# Patient Record
Sex: FEMALE | Race: BLACK OR AFRICAN AMERICAN | Employment: FULL TIME | URBAN - METROPOLITAN AREA
[De-identification: names, ages, dates, MRNs, and addresses within clinical notes are randomized per-mention and may not be internally consistent; named-entity substitution may affect disease eponyms.]

---

## 2018-01-16 ENCOUNTER — ED HISTORICAL/CONVERTED ENCOUNTER (OUTPATIENT)
Dept: OTHER | Age: 33
End: 2018-01-16

## 2018-06-18 ENCOUNTER — HOSPITAL ENCOUNTER (OUTPATIENT)
Dept: PERINATAL CARE | Age: 33
Discharge: HOME | End: 2018-06-18

## 2019-09-23 ENCOUNTER — HOSPITAL ENCOUNTER (OUTPATIENT)
Dept: GENERAL RADIOLOGY | Age: 34
Discharge: HOME | End: 2019-09-23
Payer: COMMERCIAL

## 2019-09-23 DIAGNOSIS — M25.569 KNEE PAIN: ICD-10-CM

## 2019-09-23 PROCEDURE — 73565 X-RAY EXAM OF KNEES: CPT

## 2020-03-10 ENCOUNTER — HOSPITAL ENCOUNTER (EMERGENCY)
Age: 35
Discharge: HOME | End: 2020-03-10
Attending: STUDENT IN AN ORGANIZED HEALTH CARE EDUCATION/TRAINING PROGRAM | Admitting: STUDENT IN AN ORGANIZED HEALTH CARE EDUCATION/TRAINING PROGRAM
Payer: COMMERCIAL

## 2020-03-10 VITALS
DIASTOLIC BLOOD PRESSURE: 112 MMHG | SYSTOLIC BLOOD PRESSURE: 172 MMHG | HEIGHT: 59 IN | HEART RATE: 87 BPM | TEMPERATURE: 98.4 F | RESPIRATION RATE: 16 BRPM | OXYGEN SATURATION: 100 % | BODY MASS INDEX: 54.03 KG/M2 | WEIGHT: 268 LBS

## 2020-03-10 DIAGNOSIS — R03.0 ELEVATED BLOOD PRESSURE READING: ICD-10-CM

## 2020-03-10 DIAGNOSIS — M25.561 ACUTE PAIN OF RIGHT KNEE: Primary | ICD-10-CM

## 2020-03-10 PROCEDURE — 99282 EMERGENCY DEPT VISIT SF MDM: CPT

## 2020-03-10 RX ORDER — NAPROXEN 500 MG/1
500 TABLET ORAL 2 TIMES DAILY WITH MEALS
Qty: 14 TAB | Refills: 0 | Status: SHIPPED | OUTPATIENT
Start: 2020-03-10 | End: 2020-03-17

## 2020-03-10 NOTE — ED TRIAGE NOTES
Patient presents with intermittent knee pain (right > left) x 6 months. Diagnosed in 2018 with osteoarthritis. Denies recent injury prior to onset of this pain flare-up.

## 2020-03-10 NOTE — ED PROVIDER NOTES
37 y.o. female with past medical history significant for osteoarthritis and rheumatoid arthritis who presents from private vehicle with chief complaint of right knee pain. Pt c/o worsening intermittent bilateral knee pain (R>L) since 09/2019 but dealing with arthritis for years. Pt states she was diagnosed with osteoarthritis in 2018. Pt states she receives bilateral gel knee injections every 6 months. Pt mentions she was recommended to get an MRI for further evaluation. Pt reports coming to the ED to see if she is able to get an MRI. Pt notes she was recently diagnosed with rheumatoid arthritis; she was prescribed Gabapentin and Amitriptyline. Pt has not yet seen a Rheumatologist. Pt also reports a 65 lb weight loss. Pt denies recent injury, fever, rash, hx of gout. There are no other acute medical concerns at this time. Note written by Yesy Teran, as dictated by Haroldo Lopez PA-C 11:27 AM      The history is provided by the patient. No  was used. No past medical history on file. No past surgical history on file. No family history on file.     Social History     Socioeconomic History    Marital status:      Spouse name: Not on file    Number of children: Not on file    Years of education: Not on file    Highest education level: Not on file   Occupational History    Not on file   Social Needs    Financial resource strain: Not on file    Food insecurity:     Worry: Not on file     Inability: Not on file    Transportation needs:     Medical: Not on file     Non-medical: Not on file   Tobacco Use    Smoking status: Not on file   Substance and Sexual Activity    Alcohol use: Not on file    Drug use: Not on file    Sexual activity: Not on file   Lifestyle    Physical activity:     Days per week: Not on file     Minutes per session: Not on file    Stress: Not on file   Relationships    Social connections:     Talks on phone: Not on file     Gets together: Not on file     Attends Buddhism service: Not on file     Active member of club or organization: Not on file     Attends meetings of clubs or organizations: Not on file     Relationship status: Not on file    Intimate partner violence:     Fear of current or ex partner: Not on file     Emotionally abused: Not on file     Physically abused: Not on file     Forced sexual activity: Not on file   Other Topics Concern    Not on file   Social History Narrative    Not on file         ALLERGIES: Lisinopril    Review of Systems   Constitutional: Negative for chills and fever. HENT: Negative for sore throat. Respiratory: Negative for cough and shortness of breath. Cardiovascular: Negative for chest pain. Gastrointestinal: Negative for abdominal pain and vomiting. Genitourinary: Negative for dysuria. Musculoskeletal: Positive for arthralgias. Negative for back pain.        + Bilateral knee pain. Skin: Negative for rash. Neurological: Negative for syncope and headaches. Psychiatric/Behavioral: Negative for confusion. All other systems reviewed and are negative. Vitals:    03/10/20 1122   BP: (!) 185/97   Pulse: 87   Resp: 16   Temp: 98.4 °F (36.9 °C)   SpO2: 100%   Weight: 121.6 kg (268 lb)   Height: 4' 11\" (1.499 m)            Physical Exam  Vitals signs and nursing note reviewed. Constitutional:       General: She is not in acute distress. Appearance: She is well-developed. She is not toxic-appearing or diaphoretic. Comments: AA female, elevated BMI, ambulatory to ER   HENT:      Head: Normocephalic and atraumatic. Eyes:      Conjunctiva/sclera: Conjunctivae normal.   Neck:      Musculoskeletal: Full passive range of motion without pain and normal range of motion. Trachea: No tracheal tenderness. Cardiovascular:      Rate and Rhythm: Normal rate and regular rhythm. Pulses: Normal pulses.    Pulmonary:      Effort: Pulmonary effort is normal. No respiratory distress. Abdominal:      General: Bowel sounds are normal. There is no distension. Musculoskeletal:         General: Tenderness present. Comments: R knee - minimal warmth to medial aspect; no erythema, no palpable joint effusion. No calf or popliteal TTP. No LE edema or palpable cords. NVI throughout. No rash or signs of cellulitis. Skin:     General: Skin is warm and dry. Capillary Refill: Capillary refill takes less than 2 seconds. Findings: No abrasion, erythema or rash. Neurological:      Mental Status: She is alert and oriented to person, place, and time. Cranial Nerves: No cranial nerve deficit. Sensory: No sensory deficit. Coordination: Coordination normal.   Psychiatric:         Speech: Speech normal.         Behavior: Behavior normal.          MDM  Number of Diagnoses or Management Options  Acute pain of right knee:   Elevated blood pressure reading:   Diagnosis management comments:   Ddx: sprain, frx, meniscal tear / ligament injury       Amount and/or Complexity of Data Reviewed  Review and summarize past medical records: yes  Discuss the patient with other providers: yes    Patient Progress  Patient progress: stable         Procedures     42yo female, hx of arthritis and gets gel injections biannually with her orthopedist, knee pain worsening over time, has medial knee pain. She is ambulatory, no signs of DVT, no LE edema. Refused offer for ACE wrap, X-ray, lidoderm patch, diclofenac gel, Toradol and PO NSAIDs. Took her amlodipine before arrival, is asymptomatic, no dizziness, denies HA or weakness. Madhavi Hou PA-C      I discussed patient's PMH, exam findings as well as careplan with the ER attending who agrees with care plan. Madhavi Hou PA-C      DISCHARGE NOTE:  12:00 PM  The patient's results have been reviewed with them and/or available family.  Patient and/or family verbally conveyed their understanding and agreement of the patient's signs, symptoms, diagnosis, treatment and prognosis and additionally agree to follow up as recommended in the discharge instructions or to return to the Emergency Room should their condition change prior to their follow-up appointment. The patient/family verbally agrees with the care-plan and verbally conveys that all of their questions have been answered. The discharge instructions have also been provided to the patient and/or family with some educational information regarding the patient's diagnosis as well a list of reasons why the patient would want to return to the ER prior to their follow-up appointment, should their condition change. Plan:  1. F/U with pcp for BP check and ortho  2. Rx Naprosyn  3.  Refused offer for ACE wrap, X-ray, lidoderm patch, diclofenac gel, Toradol and PO NSAIDs  Return precautions discussed and advised to return to ER if worse

## 2020-03-10 NOTE — DISCHARGE INSTRUCTIONS

## 2020-03-10 NOTE — LETTER
[address] 
[facility] EMERGENCY DEPT 
[address] [place] [address] 
[phone] Work/School Note Date: 3/10/2020 To Whom It May concern: 
 
Aubrey Silvestre was seen and treated today in the emergency room by the following provider(s): 
Attending Provider: Tex Duggan DO Physician Assistant: Cass Archibald PA-C. uAbrey Silvestre may return to work on Wednesday, March 11, 2020.  
 
Sincerely, 
 
 
 
 
Karla Jim RN

## 2021-07-04 ENCOUNTER — APPOINTMENT (OUTPATIENT)
Dept: GENERAL RADIOLOGY | Age: 36
End: 2021-07-04
Attending: PHYSICIAN ASSISTANT
Payer: COMMERCIAL

## 2021-07-04 ENCOUNTER — HOSPITAL ENCOUNTER (EMERGENCY)
Age: 36
Discharge: HOME | End: 2021-07-04
Payer: COMMERCIAL

## 2021-07-04 VITALS
BODY MASS INDEX: 59.07 KG/M2 | SYSTOLIC BLOOD PRESSURE: 156 MMHG | RESPIRATION RATE: 16 BRPM | OXYGEN SATURATION: 96 % | HEART RATE: 88 BPM | TEMPERATURE: 98.2 F | DIASTOLIC BLOOD PRESSURE: 83 MMHG | WEIGHT: 293 LBS | HEIGHT: 59 IN

## 2021-07-04 DIAGNOSIS — J45.901 MODERATE ASTHMA WITH ACUTE EXACERBATION, UNSPECIFIED WHETHER PERSISTENT: ICD-10-CM

## 2021-07-04 DIAGNOSIS — J20.9 ACUTE BRONCHITIS, UNSPECIFIED ORGANISM: Primary | ICD-10-CM

## 2021-07-04 DIAGNOSIS — R05.3 PERSISTENT COUGH: ICD-10-CM

## 2021-07-04 LAB
FLUAV AG NPH QL IA: NEGATIVE
FLUBV AG NOSE QL IA: NEGATIVE

## 2021-07-04 PROCEDURE — 99282 EMERGENCY DEPT VISIT SF MDM: CPT

## 2021-07-04 PROCEDURE — 71046 X-RAY EXAM CHEST 2 VIEWS: CPT

## 2021-07-04 PROCEDURE — 87804 INFLUENZA ASSAY W/OPTIC: CPT

## 2021-07-04 RX ORDER — PREDNISONE 10 MG/1
TABLET ORAL
Qty: 18 TABLET | Refills: 0 | Status: SHIPPED | OUTPATIENT
Start: 2021-07-04 | End: 2021-07-07

## 2021-07-04 RX ORDER — AZITHROMYCIN 250 MG/1
250 TABLET, FILM COATED ORAL SEE ADMIN INSTRUCTIONS
Qty: 6 TABLET | Refills: 0 | Status: SHIPPED | OUTPATIENT
Start: 2021-07-04 | End: 2021-07-07

## 2021-07-04 RX ORDER — PROMETHAZINE HYDROCHLORIDE 6.25 MG/5ML
6.25 SYRUP ORAL
Qty: 100 ML | Refills: 0 | Status: SHIPPED | OUTPATIENT
Start: 2021-07-04 | End: 2021-07-07

## 2021-07-04 NOTE — ED PROVIDER NOTES
EMERGENCY DEPARTMENT HISTORY AND PHYSICAL EXAM      Date: 7/4/2021  Patient Name: Kaiser Jimenez    History of Presenting Illness     Chief Complaint   Patient presents with    Cough    Generalized Body Aches       History Provided By: Patient    HPI: Kaiser Jimenez, 38 y.o. female with a past medical history significant hypertension, obesity and RA, Asthma presents to the ED with cc of cough and congestion x1 week. Patient just finished a round of Levaquin for acute bronchitis. She was also told to stop taking all of her medications for RA while she was being treated for bronchitis. She had a recent chest x-ray without any evidence of pneumonia. Associated symptoms include chills and body aches. She does work at a [address] [place] [address] [employer] but denies any known sick contacts. She has been vaccinated against flu and COVID-19. No reported fevers. Patient has been using nebulizers and inhalers at home. She specifically denies chest pain, shortness of breath, abdominal pain, nausea, vomiting, diarrhea, recent travel, leg swelling. There are no other complaints, changes, or physical findings at this time. PCP: Thai Carmichael MD        Past History     Past Medical History:  Past Medical History:   Diagnosis Date    Asthma     Fibromyalgia     Rheumatoid arthritis ([facility] [address]        Past Surgical History:  No past surgical history on file. Family History:  No family history on file. Social History:  Social History     Tobacco Use    Smoking status: Not on file   Substance Use Topics    Alcohol use: Not on file    Drug use: Not on file       Allergies: Allergies   Allergen Reactions    Lisinopril Other (comments)     Her aunt had lip edema         Review of Systems   Review of Systems   Constitutional: Positive for chills. Negative for activity change and fever. HENT: Positive for congestion. Negative for ear pain, rhinorrhea, sneezing and sore throat. Eyes: Negative for pain and visual disturbance. Respiratory: Positive for cough and wheezing. Negative for shortness of breath. Cardiovascular: Negative for chest pain, palpitations and leg swelling. Gastrointestinal: Negative for abdominal pain, diarrhea, nausea and vomiting. Genitourinary: Negative for dysuria and hematuria. Musculoskeletal: Positive for myalgias. Negative for gait problem. Skin: Negative for rash. Neurological: Negative for speech difficulty, weakness and headaches. Psychiatric/Behavioral: The patient is not nervous/anxious. All other systems reviewed and are negative. Physical Exam   Physical Exam  Vitals and nursing note reviewed. Constitutional:       General: She is not in acute distress. Appearance: Normal appearance. She is not ill-appearing or toxic-appearing. HENT:      Head: Normocephalic and atraumatic. Right Ear: Tympanic membrane normal.      Left Ear: Tympanic membrane normal.      Nose: Nose normal. No congestion. Mouth/Throat:      Mouth: Mucous membranes are moist.      Pharynx: Oropharynx is clear. Posterior oropharyngeal erythema present. Eyes:      Extraocular Movements: Extraocular movements intact. Conjunctiva/sclera: Conjunctivae normal.      Pupils: Pupils are equal, round, and reactive to light. Cardiovascular:      Rate and Rhythm: Normal rate. Pulses: Normal pulses. Heart sounds: Normal heart sounds. Pulmonary:      Effort: Pulmonary effort is normal. No respiratory distress. Breath sounds: Decreased breath sounds present. No wheezing or rhonchi. Abdominal:      General: Bowel sounds are normal.      Palpations: Abdomen is soft. Tenderness: There is no abdominal tenderness. Musculoskeletal:         General: No deformity or signs of injury. Normal range of motion. Cervical back: Normal range of motion. Right lower leg: No edema. Left lower leg: No edema. Skin:     General: Skin is warm and dry.       Capillary Refill: Capillary refill takes less than 2 seconds. Findings: No rash. Neurological:      General: No focal deficit present. Mental Status: She is alert and oriented to person, place, and time. Cranial Nerves: No cranial nerve deficit. Psychiatric:         Mood and Affect: Mood normal.         Diagnostic Study Results     Labs -     Recent Results (from the past 48 hour(s))   COVID-19 RAPID TEST    Collection Time: 07/06/21 10:00 AM   Result Value Ref Range    Specimen source Nasopharyngeal      COVID-19 rapid test Not Detected Not Detected     CBC WITH AUTOMATED DIFF    Collection Time: 07/06/21 10:11 AM   Result Value Ref Range    WBC 14.5 (H) 3.6 - 11.0 K/uL    RBC 3.87 3.80 - 5.20 M/uL    HGB 12.1 11.5 - 16.0 g/dL    HCT 36.2 35.0 - 47.0 %    MCV 93.5 80.0 - 99.0 FL    MCH 31.3 26.0 - 34.0 PG    MCHC 33.4 30.0 - 36.5 g/dL    RDW 15.6 (H) 11.5 - 14.5 %    PLATELET 966 300 - 353 K/uL    MPV 10.0 8.9 - 12.9 FL    NRBC 0.0 0.0  WBC    ABSOLUTE NRBC 0.00 0.00 - 0.01 K/uL    NEUTROPHILS 61 32 - 75 %    LYMPHOCYTES 29 12 - 49 %    MONOCYTES 10 5 - 13 %    EOSINOPHILS 0 0 - 7 %    BASOPHILS 0 0 - 1 %    IMMATURE GRANULOCYTES 0 %    ABS. NEUTROPHILS 8.8 (H) 1.8 - 8.0 K/UL    ABS. LYMPHOCYTES 4.2 (H) 0.8 - 3.5 K/UL    ABS. MONOCYTES 1.5 (H) 0.0 - 1.0 K/UL    ABS. EOSINOPHILS 0.0 0.0 - 0.4 K/UL    ABS. BASOPHILS 0.0 0.0 - 0.1 K/UL    ABS. IMM.  GRANS. 0.0 K/UL    DF Smear Scanned      RBC COMMENTS Anisocytosis  1+       METABOLIC PANEL, COMPREHENSIVE    Collection Time: 07/06/21 10:11 AM   Result Value Ref Range    Sodium 137 136 - 145 mmol/L    Potassium 3.0 (L) 3.5 - 5.1 mmol/L    Chloride 105 97 - 108 mmol/L    CO2 24 21 - 32 mmol/L    Anion gap 8 5 - 15 mmol/L    Glucose 101 (H) 65 - 100 mg/dL    BUN 7 6 - 20 mg/dL    Creatinine 0.80 0.55 - 1.02 mg/dL    BUN/Creatinine ratio 9 (L) 12 - 20      GFR est AA >60 >60 ml/min/1.73m2    GFR est non-AA >60 >60 ml/min/1.73m2    Calcium 8.9 8.5 - 10.1 mg/dL Bilirubin, total 0.2 0.2 - 1.0 mg/dL    AST (SGOT) 36 15 - 37 U/L    ALT (SGPT) 29 12 - 78 U/L    Alk. phosphatase 90 45 - 117 U/L    Protein, total 7.5 6.4 - 8.2 g/dL    Albumin 3.1 (L) 3.5 - 5.0 g/dL    Globulin 4.4 (H) 2.0 - 4.0 g/dL    A-G Ratio 0.7 (L) 1.1 - 2.2     LACTIC ACID    Collection Time: 07/06/21 10:11 AM   Result Value Ref Range    Lactic acid 1.6 0.4 - 2.0 mmol/L   TROPONIN I    Collection Time: 07/06/21 10:11 AM   Result Value Ref Range    Troponin-I, Qt. <0.05 <0.05 ng/mL   BNP    Collection Time: 07/06/21 10:11 AM   Result Value Ref Range    NT pro- (H) <125 pg/mL   HCG QL SERUM    Collection Time: 07/06/21 10:11 AM   Result Value Ref Range    HCG, Ql. Negative Negative         Radiologic Studies -   XR Results (most recent):  Results from Hospital Encounter encounter on 07/06/21    XR CHEST SNGL V    Narrative  Chest, frontal view, 7/6/2021    History: Shortness of breath. Comparison: Including chest 7/4/2021. Findings: The study is limited by the patient's body habitus. The cardiac  silhouette is within normal limits. The lungs are adequately expanded. No  hydrostatic edema is present. No focal consolidation, pleural effusions or  pneumothorax is identified. No acute osseous findings are definitively seen. Impression  No acute pulmonary process. CT Results  (Last 48 hours)    None            Medical Decision Making and ED Course   I am the first provider for this patient. I reviewed the vital signs, available nursing notes, past medical history, past surgical history, family history and social history. Vital Signs-Reviewed the patient's vital signs.   Wt Readings from Last 3 Encounters:   07/06/21 133.8 kg (295 lb)   07/04/21 133.8 kg (295 lb)   03/10/20 121.6 kg (268 lb)     Temp Readings from Last 3 Encounters:   07/06/21 98.2 °F (36.8 °C)   07/04/21 98.2 °F (36.8 °C)   03/10/20 98.4 °F (36.9 °C)     BP Readings from Last 3 Encounters:   07/06/21 (!) 138/98 07/04/21 (!) 156/83   03/10/20 (!) 172/112     Pulse Readings from Last 3 Encounters:   07/06/21 82   07/04/21 88   03/10/20 87       No data found. Records Reviewed: Nursing Notes and Old Medical Records    Provider Notes (Medical Decision Making):       MDM  Number of Diagnoses or Management Options  Acute bronchitis, unspecified organism  Moderate asthma with acute exacerbation, unspecified whether persistent  Persistent cough  Diagnosis management comments: Differentials include asthma exacerbation, pneumonia, bronchitis, viral URI       Amount and/or Complexity of Data Reviewed  Clinical lab tests: ordered and reviewed  Tests in the radiology section of CPT®: ordered and reviewed  Review and summarize past medical records: yes          ED Course:   Initial assessment performed. The patients presenting problems have been discussed, and they are in agreement with the care plan formulated and outlined with them. I have encouraged them to ask questions as they arise throughout their visit. Procedures       Matt Ruano PA-C    Procedures     Matt Ruano PA-C        Disposition     Disposition: DC- Adult Discharges: All of the diagnostic tests were reviewed and questions answered. Diagnosis, care plan and treatment options were discussed. The patient understands the instructions and will follow up as directed. The patients results have been reviewed with them. They have been counseled regarding their diagnosis. The patient verbally convey understanding and agreement of the signs, symptoms, diagnosis, treatment and prognosis and additionally agrees to follow up as recommended with their PCP in 24 - 48 hours. They also agree with the care-plan and convey that all of their questions have been answered.   I have also put together some discharge instructions for them that include: 1) educational information regarding their diagnosis, 2) how to care for their diagnosis at home, as well a 3) list of reasons why they would want to return to the ED prior to their follow-up appointment, should their condition change. Discharged     DISCHARGE PLAN:  1. There are no discharge medications for this patient. 2.   Follow-up Information     Follow up With Specialties Details Why Contact Info    Comfort Smith MD [facility] Medicine Schedule an appointment as soon as possible for a visit  for follow up from ER visit [address] [facility] [address]  [phone]      [facility] EMERGENCY DEPT Emergency Medicine  As needed, If symptoms worsen [address] [facility] [address] [address] [phone]        3. Return to ED if worse   4. Discharge Medication List as of 7/4/2021 12:00 PM      START taking these medications    Details   azithromycin (Zithromax Z-Dio) 250 mg tablet Take 1 Tablet by mouth See Admin Instructions for 6 days. Follow instructions on package, Normal, Disp-6 Tablet, R-0      predniSONE (STERAPRED DS) 10 mg dose pack Take 3 tabs for 3 days, take 2 tabs for 3 days, and take 1 tab for 3 days, by mouth, once daily, Normal, Disp-18 Tablet, R-0      promethazine (PHENERGAN) 6.25 mg/5 mL syrup Take 5 mL by mouth four (4) times daily as needed for Nausea for up to 7 days. , Normal, Disp-100 mL, R-0             Diagnosis     Clinical Impression:   1. Acute bronchitis, unspecified organism    2. Moderate asthma with acute exacerbation, unspecified whether persistent    3. Persistent cough        Attestations:    Bryon Andino PA-C    Please note that this dictation was completed with [employer], the computer voice recognition software. Quite often unanticipated grammatical, syntax, homophones, and other interpretive errors are inadvertently transcribed by the computer software. Please disregard these errors. Please excuse any errors that have escaped final proofreading. Thank you.

## 2021-07-04 NOTE — ED TRIAGE NOTES
[facility] pt stated that she went to her doctor on Monday and was told that she did not have PNA but pt has been feeling bad; c/o wheezing/crackles when she breaths, body aches, fever of and on, and cough;  Hx RA

## 2021-07-06 ENCOUNTER — APPOINTMENT (OUTPATIENT)
Dept: CT IMAGING | Age: 36
End: 2021-07-06
Attending: INTERNAL MEDICINE
Payer: COMMERCIAL

## 2021-07-06 ENCOUNTER — HOSPITAL ENCOUNTER (OUTPATIENT)
Age: 36
Setting detail: OBSERVATION
Discharge: HOME | End: 2021-07-07
Attending: EMERGENCY MEDICINE | Admitting: HOSPITALIST
Payer: COMMERCIAL

## 2021-07-06 ENCOUNTER — APPOINTMENT (OUTPATIENT)
Dept: GENERAL RADIOLOGY | Age: 36
End: 2021-07-06
Attending: EMERGENCY MEDICINE
Payer: COMMERCIAL

## 2021-07-06 DIAGNOSIS — J18.9 COMMUNITY ACQUIRED PNEUMONIA, UNSPECIFIED LATERALITY: Primary | ICD-10-CM

## 2021-07-06 PROBLEM — J45.901 ACUTE BRONCHITIS WITH ASTHMA WITH ACUTE EXACERBATION: Status: ACTIVE | Noted: 2021-07-06

## 2021-07-06 PROBLEM — J20.9 ACUTE BRONCHITIS WITH ASTHMA WITH ACUTE EXACERBATION: Status: ACTIVE | Noted: 2021-07-06

## 2021-07-06 LAB
ALBUMIN SERPL-MCNC: 3.1 G/DL (ref 3.5–5)
ALBUMIN/GLOB SERPL: 0.7 {RATIO} (ref 1.1–2.2)
ALP SERPL-CCNC: 90 U/L (ref 45–117)
ALT SERPL-CCNC: 29 U/L (ref 12–78)
ANION GAP SERPL CALC-SCNC: 8 MMOL/L (ref 5–15)
AST SERPL W P-5'-P-CCNC: 36 U/L (ref 15–37)
BASOPHILS # BLD: 0 K/UL (ref 0–0.1)
BASOPHILS NFR BLD: 0 % (ref 0–1)
BILIRUB SERPL-MCNC: 0.2 MG/DL (ref 0.2–1)
BNP SERPL-MCNC: 277 PG/ML
BUN SERPL-MCNC: 7 MG/DL (ref 6–20)
BUN/CREAT SERPL: 9 (ref 12–20)
CA-I BLD-MCNC: 8.9 MG/DL (ref 8.5–10.1)
CHLORIDE SERPL-SCNC: 105 MMOL/L (ref 97–108)
CO2 SERPL-SCNC: 24 MMOL/L (ref 21–32)
COVID-19 RAPID TEST, COVR: NOT DETECTED
CREAT SERPL-MCNC: 0.8 MG/DL (ref 0.55–1.02)
DIFFERENTIAL METHOD BLD: ABNORMAL
EOSINOPHIL # BLD: 0 K/UL (ref 0–0.4)
EOSINOPHIL NFR BLD: 0 % (ref 0–7)
ERYTHROCYTE [DISTWIDTH] IN BLOOD BY AUTOMATED COUNT: 15.6 % (ref 11.5–14.5)
GLOBULIN SER CALC-MCNC: 4.4 G/DL (ref 2–4)
GLUCOSE SERPL-MCNC: 101 MG/DL (ref 65–100)
HCG SERPL QL: NEGATIVE
HCT VFR BLD AUTO: 36.2 % (ref 35–47)
HGB BLD-MCNC: 12.1 G/DL (ref 11.5–16)
IMM GRANULOCYTES # BLD AUTO: 0 K/UL
IMM GRANULOCYTES NFR BLD AUTO: 0 %
LACTATE SERPL-SCNC: 1.6 MMOL/L (ref 0.4–2)
LYMPHOCYTES # BLD: 4.2 K/UL (ref 0.8–3.5)
LYMPHOCYTES NFR BLD: 29 % (ref 12–49)
MCH RBC QN AUTO: 31.3 PG (ref 26–34)
MCHC RBC AUTO-ENTMCNC: 33.4 G/DL (ref 30–36.5)
MCV RBC AUTO: 93.5 FL (ref 80–99)
MONOCYTES # BLD: 1.5 K/UL (ref 0–1)
MONOCYTES NFR BLD: 10 % (ref 5–13)
NEUTS SEG # BLD: 8.8 K/UL (ref 1.8–8)
NEUTS SEG NFR BLD: 61 % (ref 32–75)
NRBC # BLD: 0 K/UL (ref 0–0.01)
NRBC BLD-RTO: 0 PER 100 WBC
PLATELET # BLD AUTO: 332 K/UL (ref 150–400)
PMV BLD AUTO: 10 FL (ref 8.9–12.9)
POTASSIUM SERPL-SCNC: 3 MMOL/L (ref 3.5–5.1)
PROT SERPL-MCNC: 7.5 G/DL (ref 6.4–8.2)
RBC # BLD AUTO: 3.87 M/UL (ref 3.8–5.2)
RBC MORPH BLD: ABNORMAL
SODIUM SERPL-SCNC: 137 MMOL/L (ref 136–145)
SPECIMEN SOURCE: NORMAL
TROPONIN I SERPL-MCNC: <0.05 NG/ML
WBC # BLD AUTO: 14.5 K/UL (ref 3.6–11)

## 2021-07-06 PROCEDURE — 74011250637 HC RX REV CODE- 250/637: Performed by: HOSPITALIST

## 2021-07-06 PROCEDURE — 96374 THER/PROPH/DIAG INJ IV PUSH: CPT

## 2021-07-06 PROCEDURE — 99218 HC RM OBSERVATION: CPT

## 2021-07-06 PROCEDURE — 84703 CHORIONIC GONADOTROPIN ASSAY: CPT

## 2021-07-06 PROCEDURE — 74011250637 HC RX REV CODE- 250/637: Performed by: EMERGENCY MEDICINE

## 2021-07-06 PROCEDURE — 74011636637 HC RX REV CODE- 636/637: Performed by: EMERGENCY MEDICINE

## 2021-07-06 PROCEDURE — 87635 SARS-COV-2 COVID-19 AMP PRB: CPT

## 2021-07-06 PROCEDURE — 96375 TX/PRO/DX INJ NEW DRUG ADDON: CPT

## 2021-07-06 PROCEDURE — 74011250636 HC RX REV CODE- 250/636: Performed by: NURSE PRACTITIONER

## 2021-07-06 PROCEDURE — 84484 ASSAY OF TROPONIN QUANT: CPT

## 2021-07-06 PROCEDURE — 96376 TX/PRO/DX INJ SAME DRUG ADON: CPT

## 2021-07-06 PROCEDURE — 93005 ELECTROCARDIOGRAM TRACING: CPT

## 2021-07-06 PROCEDURE — 74011000250 HC RX REV CODE- 250: Performed by: NURSE PRACTITIONER

## 2021-07-06 PROCEDURE — 94640 AIRWAY INHALATION TREATMENT: CPT

## 2021-07-06 PROCEDURE — 80053 COMPREHEN METABOLIC PANEL: CPT

## 2021-07-06 PROCEDURE — 99284 EMERGENCY DEPT VISIT MOD MDM: CPT

## 2021-07-06 PROCEDURE — 87040 BLOOD CULTURE FOR BACTERIA: CPT

## 2021-07-06 PROCEDURE — 83605 ASSAY OF LACTIC ACID: CPT

## 2021-07-06 PROCEDURE — 85025 COMPLETE CBC W/AUTO DIFF WBC: CPT

## 2021-07-06 PROCEDURE — 71250 CT THORAX DX C-: CPT

## 2021-07-06 PROCEDURE — 74011250637 HC RX REV CODE- 250/637: Performed by: NURSE PRACTITIONER

## 2021-07-06 PROCEDURE — 71045 X-RAY EXAM CHEST 1 VIEW: CPT

## 2021-07-06 PROCEDURE — 83880 ASSAY OF NATRIURETIC PEPTIDE: CPT

## 2021-07-06 RX ORDER — FOLIC ACID 1 MG/1
1 TABLET ORAL DAILY
Status: DISCONTINUED | OUTPATIENT
Start: 2021-07-07 | End: 2021-07-07 | Stop reason: HOSPADM

## 2021-07-06 RX ORDER — ALPRAZOLAM 0.25 MG/1
0.5 TABLET ORAL
Status: DISCONTINUED | OUTPATIENT
Start: 2021-07-06 | End: 2021-07-07 | Stop reason: HOSPADM

## 2021-07-06 RX ORDER — ALBUTEROL SULFATE 90 UG/1
2 AEROSOL, METERED RESPIRATORY (INHALATION)
Status: DISCONTINUED | OUTPATIENT
Start: 2021-07-06 | End: 2021-07-07 | Stop reason: HOSPADM

## 2021-07-06 RX ORDER — NICOTINE 7MG/24HR
1 PATCH, TRANSDERMAL 24 HOURS TRANSDERMAL DAILY
Status: DISCONTINUED | OUTPATIENT
Start: 2021-07-07 | End: 2021-07-07 | Stop reason: HOSPADM

## 2021-07-06 RX ORDER — PREDNISONE 20 MG/1
60 TABLET ORAL ONCE
Status: COMPLETED | OUTPATIENT
Start: 2021-07-06 | End: 2021-07-06

## 2021-07-06 RX ORDER — ADALIMUMAB 40MG/0.8ML
40 KIT SUBCUTANEOUS
COMMUNITY

## 2021-07-06 RX ORDER — IBUPROFEN 200 MG
200 TABLET ORAL
Status: DISCONTINUED | OUTPATIENT
Start: 2021-07-06 | End: 2021-07-06

## 2021-07-06 RX ORDER — ENOXAPARIN SODIUM 100 MG/ML
40 INJECTION SUBCUTANEOUS EVERY 24 HOURS
Status: DISCONTINUED | OUTPATIENT
Start: 2021-07-06 | End: 2021-07-07 | Stop reason: HOSPADM

## 2021-07-06 RX ORDER — BUDESONIDE AND FORMOTEROL FUMARATE DIHYDRATE 80; 4.5 UG/1; UG/1
2 AEROSOL RESPIRATORY (INHALATION)
Status: DISCONTINUED | OUTPATIENT
Start: 2021-07-06 | End: 2021-07-07 | Stop reason: HOSPADM

## 2021-07-06 RX ORDER — IBUPROFEN 200 MG
200 TABLET ORAL
Status: DISCONTINUED | OUTPATIENT
Start: 2021-07-06 | End: 2021-07-07 | Stop reason: HOSPADM

## 2021-07-06 RX ORDER — FUROSEMIDE 10 MG/ML
40 INJECTION INTRAMUSCULAR; INTRAVENOUS DAILY
Status: DISCONTINUED | OUTPATIENT
Start: 2021-07-07 | End: 2021-07-07 | Stop reason: HOSPADM

## 2021-07-06 RX ORDER — FUROSEMIDE 10 MG/ML
40 INJECTION INTRAMUSCULAR; INTRAVENOUS ONCE
Status: COMPLETED | OUTPATIENT
Start: 2021-07-06 | End: 2021-07-06

## 2021-07-06 RX ORDER — DOXYCYCLINE 50 MG/1
100 CAPSULE ORAL EVERY 12 HOURS
Status: DISCONTINUED | OUTPATIENT
Start: 2021-07-06 | End: 2021-07-06

## 2021-07-06 RX ORDER — LANOLIN ALCOHOL/MO/W.PET/CERES
1 CREAM (GRAM) TOPICAL
Status: DISCONTINUED | OUTPATIENT
Start: 2021-07-07 | End: 2021-07-06

## 2021-07-06 RX ORDER — AMLODIPINE BESYLATE 5 MG/1
10 TABLET ORAL DAILY
Status: DISCONTINUED | OUTPATIENT
Start: 2021-07-07 | End: 2021-07-07 | Stop reason: HOSPADM

## 2021-07-06 RX ORDER — POTASSIUM CHLORIDE 20 MEQ/1
40 TABLET, EXTENDED RELEASE ORAL
Status: COMPLETED | OUTPATIENT
Start: 2021-07-06 | End: 2021-07-06

## 2021-07-06 RX ORDER — ALBUTEROL SULFATE 90 UG/1
3 AEROSOL, METERED RESPIRATORY (INHALATION) ONCE
Status: COMPLETED | OUTPATIENT
Start: 2021-07-06 | End: 2021-07-06

## 2021-07-06 RX ORDER — ALPRAZOLAM 0.5 MG/1
0.5 TABLET ORAL
COMMUNITY
End: 2021-08-01

## 2021-07-06 RX ORDER — GABAPENTIN 300 MG/1
300 CAPSULE ORAL 3 TIMES DAILY
COMMUNITY

## 2021-07-06 RX ORDER — IPRATROPIUM BROMIDE AND ALBUTEROL SULFATE 2.5; .5 MG/3ML; MG/3ML
3 SOLUTION RESPIRATORY (INHALATION)
Status: DISCONTINUED | OUTPATIENT
Start: 2021-07-06 | End: 2021-07-07 | Stop reason: HOSPADM

## 2021-07-06 RX ORDER — PROMETHAZINE HYDROCHLORIDE AND CODEINE PHOSPHATE 6.25; 1 MG/5ML; MG/5ML
5 SOLUTION ORAL
Status: DISCONTINUED | OUTPATIENT
Start: 2021-07-06 | End: 2021-07-07

## 2021-07-06 RX ORDER — AMITRIPTYLINE HYDROCHLORIDE 10 MG/1
10 TABLET, FILM COATED ORAL
COMMUNITY

## 2021-07-06 RX ORDER — HYDROCHLOROTHIAZIDE 12.5 MG/1
12.5 TABLET ORAL DAILY
COMMUNITY

## 2021-07-06 RX ORDER — GABAPENTIN 300 MG/1
300 CAPSULE ORAL 3 TIMES DAILY
Status: DISCONTINUED | OUTPATIENT
Start: 2021-07-06 | End: 2021-07-07 | Stop reason: HOSPADM

## 2021-07-06 RX ORDER — BENZONATATE 100 MG/1
100 CAPSULE ORAL 3 TIMES DAILY
Status: DISCONTINUED | OUTPATIENT
Start: 2021-07-06 | End: 2021-07-07 | Stop reason: HOSPADM

## 2021-07-06 RX ORDER — LANOLIN ALCOHOL/MO/W.PET/CERES
CREAM (GRAM) TOPICAL
COMMUNITY

## 2021-07-06 RX ORDER — HYDROCHLOROTHIAZIDE 25 MG/1
12.5 TABLET ORAL 2 TIMES DAILY
Status: DISCONTINUED | OUTPATIENT
Start: 2021-07-07 | End: 2021-07-07 | Stop reason: HOSPADM

## 2021-07-06 RX ORDER — LANOLIN ALCOHOL/MO/W.PET/CERES
3 CREAM (GRAM) TOPICAL
Status: DISCONTINUED | OUTPATIENT
Start: 2021-07-06 | End: 2021-07-07 | Stop reason: HOSPADM

## 2021-07-06 RX ORDER — ONDANSETRON 2 MG/ML
4 INJECTION INTRAMUSCULAR; INTRAVENOUS
Status: DISCONTINUED | OUTPATIENT
Start: 2021-07-06 | End: 2021-07-07 | Stop reason: HOSPADM

## 2021-07-06 RX ORDER — DOXYCYCLINE 100 MG/1
100 CAPSULE ORAL EVERY 12 HOURS
Status: DISCONTINUED | OUTPATIENT
Start: 2021-07-06 | End: 2021-07-07 | Stop reason: HOSPADM

## 2021-07-06 RX ORDER — PREDNISONE 5 MG/1
5 TABLET ORAL DAILY
COMMUNITY

## 2021-07-06 RX ORDER — METHOTREXATE 2.5 MG/1
2.5 TABLET ORAL
COMMUNITY

## 2021-07-06 RX ORDER — FOLIC ACID 1 MG/1
1 TABLET ORAL DAILY
COMMUNITY

## 2021-07-06 RX ADMIN — POTASSIUM CHLORIDE 40 MEQ: 1500 TABLET, EXTENDED RELEASE ORAL at 11:38

## 2021-07-06 RX ADMIN — DOXYCYCLINE HYCLATE 100 MG: 100 CAPSULE ORAL at 16:29

## 2021-07-06 RX ADMIN — BENZONATATE 100 MG: 100 CAPSULE ORAL at 16:29

## 2021-07-06 RX ADMIN — FAMOTIDINE 20 MG: 10 INJECTION, SOLUTION INTRAVENOUS at 21:49

## 2021-07-06 RX ADMIN — GABAPENTIN 300 MG: 300 CAPSULE ORAL at 21:46

## 2021-07-06 RX ADMIN — ALBUTEROL SULFATE 3 PUFF: 108 AEROSOL, METERED RESPIRATORY (INHALATION) at 10:44

## 2021-07-06 RX ADMIN — PREDNISONE 60 MG: 20 TABLET ORAL at 10:21

## 2021-07-06 RX ADMIN — FAMOTIDINE 20 MG: 10 INJECTION, SOLUTION INTRAVENOUS at 16:28

## 2021-07-06 RX ADMIN — FUROSEMIDE 40 MG: 10 INJECTION, SOLUTION INTRAMUSCULAR; INTRAVENOUS at 13:59

## 2021-07-06 RX ADMIN — BUDESONIDE AND FORMOTEROL FUMARATE DIHYDRATE 2 PUFF: 80; 4.5 AEROSOL RESPIRATORY (INHALATION) at 19:29

## 2021-07-06 RX ADMIN — GABAPENTIN 300 MG: 300 CAPSULE ORAL at 16:29

## 2021-07-06 RX ADMIN — BENZONATATE 100 MG: 100 CAPSULE ORAL at 21:46

## 2021-07-06 NOTE — ED NOTES
Care assumed and bedside SBAR report endorsed on Javid Hughes. Pt cardiac monitoring continued , spO2 Monitoring continued, IV reassed, call bell within reach, side rails up x2, resting comfortable but easily arousable, no signs of acute distress. , bed in lowest position, MAR reviewed, Labs reviewed, will continue to monitor

## 2021-07-06 NOTE — PROGRESS NOTES
7/6/21. PCP is Renetta Wheat. D/C Plan is home & pt plans to drive self home - drove here- car in parking lot. Declined home health/no needs/uses no DME.

## 2021-07-06 NOTE — H&P
History and Physical    Patient: Gibran Jules MRN: [id number]  SSN: [id number]    YOB: 1985  Age: 38 y.o. Sex: female      Subjective:      Gibran Jules is a 38 y.o. female who comes to the ED with complaints of dyspnea, cough and leg edema. She reports of approximately one day episode, she denies contact with sick persons. PMH includes asthma, hypertension , rheumatoid arthritis, fibromyalgia, neuropathy, and anxiety. CXR does not show any acute process, previous x ray on  shows patchy bilateral perihilar opacities airspace disease and edema. Pt will be admitted for further evaluation and treatment. Past Medical History:   Diagnosis Date    Asthma     Fibromyalgia     Hypertension     Rheumatoid arthritis ([facility] [address]      History reviewed. No pertinent surgical history. Family History   Problem Relation Age of Onset   Royal Echols Hypertension Mother     Hypertension Father     Asthma Sister     Asthma Other     Asthma Nephew      Social History     Tobacco Use    Smoking status: Former Smoker     Packs/day: 0.50     Years: 10.00     Pack years: 5.00     Types: Cigarettes     Quit date: 2021     Years since quittin.0    Smokeless tobacco: Never Used   Substance Use Topics    Alcohol use: Not Currently      Prior to Admission medications    Medication Sig Start Date End Date Taking? Authorizing Provider   azithromycin (Zithromax Z-Dio) 250 mg tablet Take 1 Tablet by mouth See Admin Instructions for 6 days. Follow instructions on package 7/4/21 7/10/21 Yes GayMarnie Barrios PA-C   predniSONE (STERAPRED DS) 10 mg dose pack Take 3 tabs for 3 days, take 2 tabs for 3 days, and take 1 tab for 3 days, by mouth, once daily 21  Yes Marnie Flores PA-C   promethazine (PHENERGAN) 6.25 mg/5 mL syrup Take 5 mL by mouth four (4) times daily as needed for Nausea for up to 7 days.  21  Aviva Champion PA-C        Allergies   Allergen Reactions    Lisinopril Other (comments)     Her aunt had lip edema       Review of Systems:  Review of Systems   Constitutional: Negative for chills and fever. HENT: Positive for congestion. Negative for sore throat. Respiratory: Positive for cough, shortness of breath and wheezing. Cardiovascular: Negative for chest pain and palpitations. Gastrointestinal: Positive for constipation. Negative for abdominal pain, heartburn, nausea and vomiting. Genitourinary: Negative for dysuria and urgency. Musculoskeletal: Positive for myalgias. Neurological: Negative for dizziness and headaches. Objective:     Vitals:    07/06/21 1003 07/06/21 1012 07/06/21 1047   BP: (!) 147/103  (!) 138/98   Pulse: 97  82   Resp: 24  22   Temp: 98.2 °F (36.8 °C)     SpO2: 98% 98% 98%   Weight: 133.8 kg (295 lb)     Height: 4' 11\" (1.499 m)          Physical Exam:  Physical Exam  Constitutional:       General: She is not in acute distress. Appearance: She is obese. HENT:      Head: Normocephalic and atraumatic. Mouth/Throat:      Mouth: Mucous membranes are moist.   Cardiovascular:      Rate and Rhythm: Normal rate and regular rhythm. Pulses: Normal pulses. Heart sounds: Normal heart sounds. Pulmonary:      Breath sounds: Wheezing and rhonchi present. Musculoskeletal:         General: Normal range of motion. Skin:     General: Skin is warm. Neurological:      Mental Status: She is oriented to person, place, and time.    Psychiatric:         Mood and Affect: Mood normal.         Behavior: Behavior normal.          Recent Results (from the past 24 hour(s))   COVID-19 RAPID TEST    Collection Time: 07/06/21 10:00 AM   Result Value Ref Range    Specimen source Nasopharyngeal      COVID-19 rapid test Not Detected Not Detected     CBC WITH AUTOMATED DIFF    Collection Time: 07/06/21 10:11 AM   Result Value Ref Range    WBC 14.5 (H) 3.6 - 11.0 K/uL    RBC 3.87 3.80 - 5.20 M/uL    HGB 12.1 11.5 - 16.0 g/dL    HCT 36.2 35.0 - 47.0 % MCV 93.5 80.0 - 99.0 FL    MCH 31.3 26.0 - 34.0 PG    MCHC 33.4 30.0 - 36.5 g/dL    RDW 15.6 (H) 11.5 - 14.5 %    PLATELET 562 195 - 854 K/uL    MPV 10.0 8.9 - 12.9 FL    NRBC 0.0 0.0  WBC    ABSOLUTE NRBC 0.00 0.00 - 0.01 K/uL    NEUTROPHILS 61 32 - 75 %    LYMPHOCYTES 29 12 - 49 %    MONOCYTES 10 5 - 13 %    EOSINOPHILS 0 0 - 7 %    BASOPHILS 0 0 - 1 %    IMMATURE GRANULOCYTES 0 %    ABS. NEUTROPHILS 8.8 (H) 1.8 - 8.0 K/UL    ABS. LYMPHOCYTES 4.2 (H) 0.8 - 3.5 K/UL    ABS. MONOCYTES 1.5 (H) 0.0 - 1.0 K/UL    ABS. EOSINOPHILS 0.0 0.0 - 0.4 K/UL    ABS. BASOPHILS 0.0 0.0 - 0.1 K/UL    ABS. IMM. GRANS. 0.0 K/UL    DF Smear Scanned      RBC COMMENTS Anisocytosis  1+       METABOLIC PANEL, COMPREHENSIVE    Collection Time: 07/06/21 10:11 AM   Result Value Ref Range    Sodium 137 136 - 145 mmol/L    Potassium 3.0 (L) 3.5 - 5.1 mmol/L    Chloride 105 97 - 108 mmol/L    CO2 24 21 - 32 mmol/L    Anion gap 8 5 - 15 mmol/L    Glucose 101 (H) 65 - 100 mg/dL    BUN 7 6 - 20 mg/dL    Creatinine 0.80 0.55 - 1.02 mg/dL    BUN/Creatinine ratio 9 (L) 12 - 20      GFR est AA >60 >60 ml/min/1.73m2    GFR est non-AA >60 >60 ml/min/1.73m2    Calcium 8.9 8.5 - 10.1 mg/dL    Bilirubin, total 0.2 0.2 - 1.0 mg/dL    AST (SGOT) 36 15 - 37 U/L    ALT (SGPT) 29 12 - 78 U/L    Alk. phosphatase 90 45 - 117 U/L    Protein, total 7.5 6.4 - 8.2 g/dL    Albumin 3.1 (L) 3.5 - 5.0 g/dL    Globulin 4.4 (H) 2.0 - 4.0 g/dL    A-G Ratio 0.7 (L) 1.1 - 2.2     LACTIC ACID    Collection Time: 07/06/21 10:11 AM   Result Value Ref Range    Lactic acid 1.6 0.4 - 2.0 mmol/L   TROPONIN I    Collection Time: 07/06/21 10:11 AM   Result Value Ref Range    Troponin-I, Qt. <0.05 <0.05 ng/mL   BNP    Collection Time: 07/06/21 10:11 AM   Result Value Ref Range    NT pro- (H) <125 pg/mL   HCG QL SERUM    Collection Time: 07/06/21 10:11 AM   Result Value Ref Range    HCG, Ql. Negative Negative         XR Results (maximum last 3):   Results from [facility] encounter on 07/06/21    XR CHEST SNGL V    Narrative  Chest, frontal view, 7/6/2021    History: Shortness of breath. Comparison: Including chest 7/4/2021. Findings: The study is limited by the patient's body habitus. The cardiac  silhouette is within normal limits. The lungs are adequately expanded. No  hydrostatic edema is present. No focal consolidation, pleural effusions or  pneumothorax is identified. No acute osseous findings are definitively seen. Impression  No acute pulmonary process. Results from [place] encounter on 07/04/21    XR CHEST PA LAT    Narrative  Chest, 2 views. Comparison: 11/2/2016. Findings: The cardiomediastinal silhouette is within normal limits. There are  patchy bilateral perihilar opacities. No pleural effusion or pneumothorax is  identified. The osseous structures are unremarkable. Impression  Patchy bilateral perihilar opacities, likely a combination of edema and airspace  disease. Results from [place] encounter on 09/23/19    XR KNEES BI STAND    Narrative  EXAM:  XR KNEES BI STAND    INDICATION:   Knee pain. COMPARISON: None. FINDINGS: 3 views of the bilateral knees were obtained. There is no evidence of  acute fracture. In the right knee, there is tricompartmental degenerative  disease, moderate in the patellofemoral compartment. Small right knee joint  effusion. In the left knee, there is tricompartmental degenerative disease,  moderate in the patellofemoral compartment. A small left knee joint effusion is  also present. Impression  IMPRESSION:  1. Tricompartmental degenerative disease bilaterally. Small joint effusions  bilaterally. No acute fracture. CT Results (maximum last 3): No results found for this or any previous visit. MRI Results (maximum last 3): No results found for this or any previous visit. Nuclear Medicine Results (maximum last 3):   No results found for this or any previous visit. US Results (maximum last 3): No results found for this or any previous visit. Active Problems:    Pneumonia (7/6/2021)      Acute bronchitis with asthma with acute exacerbation (7/6/2021)        Assessment/Plan:   1. Acute asthma /bronchitis exacerbation- start IV steroids, inhaler bid duo nebs,  Cough medication, O2 as needed, give one dose of IV lasix today. Consult to pulmonary    2. Hypertension- restart amlodipine, HCTZ    3. Hx of rheumatoid arthritis- on methotrexate, gabapentin. 4. ACP planning completed.        DVT/GI Prophylaxis lovenox / pepcid  Code Status  POA mother Jessica Garza   Total Time 35 minutes      Signed By: Kristine Giles NP     July 6, 2021

## 2021-07-06 NOTE — Clinical Note
Status[de-identified] INPATIENT [101]   Type of Bed: Remote Telemetry [29]   Cardiac Monitoring Required?: Yes   Inpatient Hospitalization Certified Necessary for the Following Reasons: 3.  Patient receiving treatment that can only be provided in an inpatient setting (further clarification in H&P documentation)   Admitting Diagnosis: Pneumonia [707405]   Admitting Physician: Mattie Vaughan [[id number]]   Attending Physician: Mattie Vaughan [[id number]]   Estimated Length of Stay: 3-4 Midnights   Discharge Plan[de-identified] Home with Office Follow-up

## 2021-07-06 NOTE — PROGRESS NOTES
Reason for Admission:  PNA                     RUR Score:  8%                   Plan for utilizing home health:  Declined/no needs/uses no DME. PCP: First and Last name:  Lynda Owusu MD     Name of Practice:    Are you a current patient: Yes/No: Yes   Approximate date of last visit: 7/6/21/today. Can you participate in a virtual visit with your PCP: Yes/Call( has cell phone). Current Advanced Directive/Advance Care Plan: Full Code      Healthcare Decision Maker:                Primary Decision Maker: Tammylucas Wharton - Mother - [phone]                  Transition of Care Plan:                    D/C Plan is home & pt plans to drive self home - drove here - car in parking lot.

## 2021-07-06 NOTE — PROGRESS NOTES
Pt arrived to floor at [address] via wheelchair. Pt alert and oriented, ambulates independently. O2 sat on room air was 98, pt complaining of 4/10 pain in her chest. No other complaints at this time. Will pass on to oncoming RN.

## 2021-07-06 NOTE — PROGRESS NOTES
Advance Care Planning     Advance Care Planning (ACP) Physician/NP/PA Conversation      Date of Conversation: 7/6/2021  Conducted with: Patient with [address] Decision Maker:     Click here to complete [employer] including selection of the [employer] Relationship (ie \"Primary\")  Today we documented Decision Maker(s) consistent with Legal Next of Kin hierarchy. Care Preferences:    Hospitalization: \"If your health worsens and it becomes clear that your chance of recovery is unlikely, what would be your preference regarding hospitalization? \"  The patient would prefer hospitalization. Ventilation: \"If you were unable to breathe on your own and your chance of recovery was unlikely, what would be your preference about the use of a ventilator (breathing machine) if it was available to you? \"   The patient would desire the use of a ventilator. Resuscitation: \"In the event your heart stopped as a result of an underlying serious health condition, would you want attempts to be made to restart your heart, or would you prefer a natural death? \"   Yes, attempt to resuscitate.     Additional topics discussed: treatment goals    Conversation Outcomes / Follow-Up Plan:   ACP complete - no further action today  Reviewed DNR/DNI and patient elects Full Code (Attempt Resuscitation)     Length of Voluntary ACP Conversation in minutes:  <16 minutes (Non-Billable)    Kenzie Ghotra NP

## 2021-07-06 NOTE — CONSULTS
Pulmonary and Critical Care Consult    Subjective:   Consult Note: 2021 @no control      Chief Complaint:   Chief Complaint   Patient presents with    Shortness of Breath    Cough        This patient has been seen and evaluated at the request of Danny Gavin NP. Patient is a 38 y.o. female admitted with shortness of breath. Active smoker  1 pack a day off and on for 19 years  Family history of asthma  Diagnosed with asthmatic bronchitis 2 years back  Does not use any maintenance inhalers  Very well controlled    Does have a prior history of fibromyalgia, hypertension, and rheumatoid arthritis  Was apparently on methotrexate for rheumatoid arthritis with recent increase in dose  Patient tells me that the day after taking the higher dose she noticed increased shortness of breath and coughing  Yellowish phlegm production  Also has increased pedal edema recently    Chest x-ray reviewed personally and shows bilateral perihilar infiltrates      Review of Systems:  A comprehensive review of systems was negative except for that written in the HPI. Past Medical History:   Diagnosis Date    Asthma     Fibromyalgia     Hypertension     Rheumatoid arthritis ([address]      History reviewed. No pertinent surgical history.    Family History   Problem Relation Age of Onset   [employer] Hypertension Mother     Hypertension Father     Asthma Sister     Asthma Other     Asthma Nephew      Social History     Tobacco Use    Smoking status: Former Smoker     Packs/day: 0.50     Years: 10.00     Pack years: 5.00     Types: Cigarettes     Quit date: 2021     Years since quittin.0    Smokeless tobacco: Never Used   Substance Use Topics    Alcohol use: Not Currently      Current Facility-Administered Medications   Medication Dose Route Frequency Provider Last Rate Last Admin    ALPRAZolam (XANAX) tablet 0.5 mg  0.5 mg Oral BID PRN Aric Delarosa NP        [START ON 2021] amLODIPine (NORVASC) tablet 10 mg  10 mg Oral DAILY Aric Delarosa NP        [START ON 7/7/2021] hydroCHLOROthiazide (HYDRODIURIL) tablet 12.5 mg  12.5 mg Oral BID Aric Delarosa NP        [START ON 7/7/2021] methylPREDNISolone (PF) (SOLU-MEDROL) injection 40 mg  40 mg IntraVENous Q8H Aric Delarosa NP        [START ON 9/2/1537] folic acid (FOLVITE) tablet 1 mg  1 mg Oral DAILY Aric Delarosa NP        [START ON 7/7/2021] ferrous sulfate tablet 325 mg  1 Tablet Oral DAILY WITH BREAKFAST Aric Delarosa NP        promethazine-codeine (PHENERGAN with CODEINE) 6.25-10 mg/5 mL syrup 5 mL  5 mL Oral Q4H PRN Aric Delarosa NP        gabapentin (NEURONTIN) capsule 300 mg  300 mg Oral TID Aric Delarosa NP        ondansetron [facility]) injection 4 mg  4 mg IntraVENous Q6H PRN Aric Delarosa NP        ibuprofen (MOTRIN) tablet 200 mg  200 mg Oral Q6H PRN Aric Delarosa NP        melatonin tablet 3 mg  3 mg Oral QHS PRN Aric Delarosa NP        budesonide-formoterol (SYMBICORT) 80-4.5 mcg inhaler  2 Puff Inhalation BID RT Aric Delarosa NP        albuterol-ipratropium (DUO-NEB) 2.5 MG-0.5 MG/3 ML  3 mL Nebulization Q4H PRN Aric Delarosa NP        albuterol (PROVENTIL HFA, VENTOLIN HFA, PROAIR HFA) inhaler 2 Puff  2 Puff Inhalation Q4H PRN Aric Delarosa NP        benzonatate (TESSALON) capsule 100 mg  100 mg Oral TID Aric Delarosa NP        [START ON 7/7/2021] nicotine (NICODERM CQ) 7 mg/24 hr patch 1 Patch  1 Patch TransDERmal DAILY Aric Delarosa NP        enoxaparin (LOVENOX) injection 40 mg  40 mg SubCUTAneous Q24H Aric Delarosa NP        famotidine (PF) (PEPCID) 20 mg in 0.9% sodium chloride 10 mL injection  20 mg IntraVENous Q12H Aric Delarosa, NP         Current Outpatient Medications   Medication Sig Dispense Refill    azithromycin (Zithromax Z-Dio) 250 mg tablet Take 1 Tablet by mouth See Admin Instructions for 6 days.  Follow instructions on package 6 Tablet 0    predniSONE (STERAPRED DS) 10 mg dose pack Take 3 tabs for 3 days, take 2 tabs for 3 days, and take 1 tab for 3 days, by mouth, once daily 18 Tablet 0    promethazine (PHENERGAN) 6.25 mg/5 mL syrup Take 5 mL by mouth four (4) times daily as needed for Nausea for up to 7 days. 100 mL 0          Allergies   Allergen Reactions    Lisinopril Other (comments)     Her aunt had lip edema           Objective:     Blood pressure (!) 138/98, pulse 82, temperature 98.2 °F (36.8 °C), resp. rate 22, height 4' 11\" (1.499 m), weight 133.8 kg (295 lb), SpO2 98 %. Temp (24hrs), Av.2 °F (36.8 °C), Min:98.2 °F (36.8 °C), Max:98.2 °F (36.8 °C)      Intake and Output:  Current Shift: No intake/output data recorded. Last 3 Shifts: No intake/output data recorded. No intake or output data in the 24 hours ending 21 1512     Physical Exam:     General:  Sitting up in bed comfortably, no acute distress. Obese  Eye: Reactive, symmetric  Throat and Neck: Supple  Lung: Reduced air entry bilaterally with prolonged exhalation, occ wheezing. Occasional crackles. Heart: S1+S2. No murmurs  Abdomen: soft, non-tender. Bowel sounds normal. No masses; obese  Extremities:  1-2+ lower extremity edema  : Not done  Skin: No cyanosis  Neurologic: A & O x3.   Grossly nonfocal  Psychiatric: Appropriate affect; coherent      Lab/Data Review:    Recent Results (from the past 24 hour(s))   COVID-19 RAPID TEST    Collection Time: 21 10:00 AM   Result Value Ref Range    Specimen source Nasopharyngeal      COVID-19 rapid test Not Detected Not Detected     CBC WITH AUTOMATED DIFF    Collection Time: 21 10:11 AM   Result Value Ref Range    WBC 14.5 (H) 3.6 - 11.0 K/uL    RBC 3.87 3.80 - 5.20 M/uL    HGB 12.1 11.5 - 16.0 g/dL    HCT 36.2 35.0 - 47.0 %    MCV 93.5 80.0 - 99.0 FL    MCH 31.3 26.0 - 34.0 PG    MCHC 33.4 30.0 - 36.5 g/dL    RDW 15.6 (H) 11.5 - 14.5 %    PLATELET 328 228 - 637 K/uL    MPV 10.0 8.9 - 12.9 FL    NRBC 0.0 0.0  WBC    ABSOLUTE NRBC 0.00 0.00 - 0.01 K/uL    NEUTROPHILS 61 32 - 75 %    LYMPHOCYTES 29 12 - 49 %    MONOCYTES 10 5 - 13 %    EOSINOPHILS 0 0 - 7 %    BASOPHILS 0 0 - 1 %    IMMATURE GRANULOCYTES 0 %    ABS. NEUTROPHILS 8.8 (H) 1.8 - 8.0 K/UL    ABS. LYMPHOCYTES 4.2 (H) 0.8 - 3.5 K/UL    ABS. MONOCYTES 1.5 (H) 0.0 - 1.0 K/UL    ABS. EOSINOPHILS 0.0 0.0 - 0.4 K/UL    ABS. BASOPHILS 0.0 0.0 - 0.1 K/UL    ABS. IMM. GRANS. 0.0 K/UL    DF Smear Scanned      RBC COMMENTS Anisocytosis  1+       METABOLIC PANEL, COMPREHENSIVE    Collection Time: 07/06/21 10:11 AM   Result Value Ref Range    Sodium 137 136 - 145 mmol/L    Potassium 3.0 (L) 3.5 - 5.1 mmol/L    Chloride 105 97 - 108 mmol/L    CO2 24 21 - 32 mmol/L    Anion gap 8 5 - 15 mmol/L    Glucose 101 (H) 65 - 100 mg/dL    BUN 7 6 - 20 mg/dL    Creatinine 0.80 0.55 - 1.02 mg/dL    BUN/Creatinine ratio 9 (L) 12 - 20      GFR est AA >60 >60 ml/min/1.73m2    GFR est non-AA >60 >60 ml/min/1.73m2    Calcium 8.9 8.5 - 10.1 mg/dL    Bilirubin, total 0.2 0.2 - 1.0 mg/dL    AST (SGOT) 36 15 - 37 U/L    ALT (SGPT) 29 12 - 78 U/L    Alk. phosphatase 90 45 - 117 U/L    Protein, total 7.5 6.4 - 8.2 g/dL    Albumin 3.1 (L) 3.5 - 5.0 g/dL    Globulin 4.4 (H) 2.0 - 4.0 g/dL    A-G Ratio 0.7 (L) 1.1 - 2.2     LACTIC ACID    Collection Time: 07/06/21 10:11 AM   Result Value Ref Range    Lactic acid 1.6 0.4 - 2.0 mmol/L   TROPONIN I    Collection Time: 07/06/21 10:11 AM   Result Value Ref Range    Troponin-I, Qt. <0.05 <0.05 ng/mL   BNP    Collection Time: 07/06/21 10:11 AM   Result Value Ref Range    NT pro- (H) <125 pg/mL   HCG QL SERUM    Collection Time: 07/06/21 10:11 AM   Result Value Ref Range    HCG, Ql. Negative Negative         XR CHEST SNGL V   Final Result   No acute pulmonary process. CT Results  (Last 48 hours)    None            Assessment:     1. Bilateral pulmonary infiltrates  2. Acute exacerbation of asthma  3. Acute diastolic congestive heart failure  4. Shortness of breath  5. Cough  6. Active smoker  7. Obesity  8. Rheumatoid arthritis  9. Leukocytosis  10. Hypertension    Plan:     Patient admitted to the hospital  We will be watching her closely    Will use oxygen supplementation if needed to keep saturation above 92%  Currently on room air    Bilateral infiltrates  Mostly perihilar  Etiology unclear  BNP elevated  Have to consider CHF versus infectious process given patient's use of methotrexate and immunocompromised status    Continue Solu-Medrol 40 mg every 8 hours  Start doxycycline 100 mg p.o. twice daily  Cultures to be sent  Further changes in antibiotics based on clinical response and culture results  We will proceed with CT scan without contrast to clarify the nature of perihilar infiltrates    Agree with Lasix 40 mg IV daily  Intake and output charting  Check electrolytes and replace as needed    Smoking cessation counseling was performed  NicoDerm patch    Continue blood pressure medications    DVT and GI prophylaxis    Patient will benefit from PFTs and sleep study after discharge    Questions of patient were answered at bedside in detail  Case discussed in detail with RN, RT, and care team  Thank you for involving me in the care of this patient  I will follow with you closely during hospitalization    Time spent more than 45 minutes in direct patient care with no overlap reviewing results and records, decision making, and answering questions.       Jose Elias Lopez MD  [facility]  7/6/2021  3:12 PM

## 2021-07-06 NOTE — ED NOTES
TRANSFER - OUT REPORT:    Verbal report given to [facility] FOREIGN SANCHEZ (name) on Mikayla East  being transferred to (unit) for routine progression of care       Report consisted of patients Situation, Background, Assessment and   Recommendations(SBAR). Information from the following report(s) SBAR was reviewed with the receiving nurse. Lines:   Peripheral IV 07/06/21 Right Antecubital (Active)   Site Assessment Clean, dry, & intact 07/06/21 1010   Phlebitis Assessment 0 07/06/21 1010   Infiltration Assessment 0 07/06/21 1010   Dressing Status Clean, dry, & intact 07/06/21 1010   Dressing Type Transparent 07/06/21 1010   Hub Color/Line Status Pink;Flushed;Patent 07/06/21 1010        Opportunity for questions and clarification was provided.       Patient transported with:   Registered Nurse

## 2021-07-06 NOTE — ED PROVIDER NOTES
HPI   Chief Complaint   Patient presents with    Shortness of Breath    Cough     68-year-old female history of asthma, fibromyalgia, rheumatoid arthritis presents with shortness of breath and cough. Patient reports a little over 1 week of sick symptoms which consist of cough, fatigue, myalgias, diaphoresis, poor appetite. Patient has been taking azithromycin, Levaquin, home inhaler, home nebulizer, low-dose prednisone without any relief. Today feeling severely constantly short of breath with wheezing despite taking home nebulizer this morning. Denies any chest pain. Denies any fevers. Patient is on methotrexate for rheumatoid arthritis for the past month. Patient reports both she and her boyfriend have had COVID-19 vaccination. Past Medical History:   Diagnosis Date    Asthma     Fibromyalgia     Rheumatoid arthritis ([facility] [address]        No past surgical history on file. No family history on file. Social History     Socioeconomic History    Marital status:      Spouse name: Not on file    Number of children: Not on file    Years of education: Not on file    Highest education level: Not on file   Occupational History    Not on file   Tobacco Use    Smoking status: Not on file   Substance and Sexual Activity    Alcohol use: Not on file    Drug use: Not on file    Sexual activity: Not on file   Other Topics Concern    Not on file   Social History Narrative    Not on file     Social Determinants of Health     Financial Resource Strain:     Difficulty of Paying Living Expenses:    Food Insecurity:     Worried About Running Out of Food in the Last Year:     [address] in the Last Year:    Transportation Needs:     Lack of Transportation (Medical):      Lack of Transportation (Non-Medical):    Physical Activity:     Days of Exercise per Week:     Minutes of Exercise per Session:    Stress:     Feeling of Stress :    Social Connections:     Frequency of Communication with Friends and Family:     Frequency of Social Gatherings with Friends and Family:     Attends Zoroast[employer] Services:     Active Member of Clubs or Organizations:     Attends Club or Organization Meetings:     Marital Status:    Intimate Partner Violence:     Fear of Current or Ex-Partner:     Emotionally Abused:     Physically Abused:     Sexually Abused: ALLERGIES: Lisinopril    Review of Systems   Constitutional: Positive for appetite change, diaphoresis and fatigue. Respiratory: Positive for cough, shortness of breath and wheezing. Musculoskeletal: Positive for myalgias. All other systems reviewed and are negative. Vitals:    07/06/21 1003 07/06/21 1012 07/06/21 1047   BP: (!) 147/103  (!) 138/98   Pulse: 97  82   Resp: 24  22   Temp: 98.2 °F (36.8 °C)     SpO2: 98% 98% 98%   Weight: 133.8 kg (295 lb)     Height: 4' 11\" (1.499 m)              Physical Exam   Patient Vitals for the past 8 hrs:   Temp Pulse Resp BP SpO2   07/06/21 1047  82 22 (!) 138/98 98 %   07/06/21 1012     98 %   07/06/21 1003 98.2 °F (36.8 °C) 97 24 (!) 147/103 98 %        Nursing note and vitals reviewed. Constitutional: NAD. Head: Normocephalic and atraumatic. Mouth/Throat: Airway patent. Moist mucous membranes. Eyes: EOMI. No scleral icterus. Neck: Neck supple. Cardiovascular: Normal rate, regular rhythm. Normal heart sounds. No murmur, rub, or gallop. Good pulses throughout. Pulmonary/Chest: Mild resp distress on room air with tachypnea. Diffusely mildly diminished breath sounds with wheezing. Abdominal/GI: BS normal, Soft, non-tender, non-distended. No rebound or guarding. Musculoskeletal: No gross injuries or deformities. No ankle swelling bilaterally. Neurological: Alert and oriented to person, place, and time. Cranial Nerves 2-12 intact. Moving all extremities. No gross deficits. Psych: Pleasant, cooperative. Skin: Skin is warm and dry. No rash noted.     MDM   Ddx = pneumonia (bacterial, opportunistic, covid), asthma exacerbation, lower suspicion for sepsis. EKG was obtained for shortness of breath. My preliminary interpretation 1005 showing normal sinus rhythm, rate 88, normal EKG, no STEMI. Given 60mg prednisone and albuterol inhaler x 3 puffs. repleted hypokalemia. I spoke with Dr. Tru Goldstein (pt's PCP), she wants pt to be admitted for failing outpatient levaquine and azithro. I consulted Dr. Bo Trammell who is admitting patient to hospitalist.   Labs Reviewed   CBC WITH AUTOMATED DIFF - Abnormal; Notable for the following components:       Result Value    WBC 14.5 (*)     RDW 15.6 (*)     All other components within normal limits   METABOLIC PANEL, COMPREHENSIVE - Abnormal; Notable for the following components:    Potassium 3.0 (*)     Glucose 101 (*)     BUN/Creatinine ratio 9 (*)     Albumin 3.1 (*)     Globulin 4.4 (*)     A-G Ratio 0.7 (*)     All other components within normal limits   BNP - Abnormal; Notable for the following components:    NT pro- (*)     All other components within normal limits   COVID-19 RAPID TEST   CULTURE, BLOOD, PAIRED   LACTIC ACID   TROPONIN I   HCG QL SERUM     XR CHEST SNGL V   Final Result   No acute pulmonary process. Medications   predniSONE (DELTASONE) tablet 60 mg (60 mg Oral Given 7/6/21 1021)   albuterol (PROVENTIL HFA, VENTOLIN HFA, PROAIR HFA) inhaler 3 Puff (3 Puffs Inhalation Given 7/6/21 1044)   potassium chloride (K-DUR, KLOR-CON) SR tablet 40 mEq (40 mEq Oral Given 7/6/21 1138)     IKristi MD, am  the first and primary ED provider for this patient.           Procedures

## 2021-07-06 NOTE — ACP (ADVANCE CARE PLANNING)
Advance Care Planning   Healthcare Decision Maker:       Primary Decision Maker: Alessandra Garza - Mother - [phone]

## 2021-07-06 NOTE — ED NOTES
Pt presented to the ER with complaint of Bronchitis. Pt stated she was seen here this weekend and she was dx with bronchitis. Pt stated she was started on Z-khadijah and Prednisone. Pt went to see MD Elsie Kurtz and was sent here do to pt not improving. oriented to room and call bell,, cardiac monitoring initiated , spO2 Monitoring initiated , IV  initiated , call bell within reach, side rails up x2, resting comfortable but easily arousable, no signs of acute distress. , bed in lowest position, will continue to monitor      Airway: Patent, Managing oral secretions and No obstruction    Respiratory: Tachypnea, Shallow Depth, No acute Distress and Speaking in full sentences    Cardiac: Chest pain, Non Radiating, increase pain with coughing, Pain Scale 6/10 and Onset Sharp    Neuro: AVPU Alert and A&O4/4    GI: Abdominal pain to RUQ, LUQ, pt denies any N/V/D    : WNL    Muscle/Skeletal/Skin: WNL

## 2021-07-07 VITALS
WEIGHT: 293 LBS | BODY MASS INDEX: 59.07 KG/M2 | SYSTOLIC BLOOD PRESSURE: 128 MMHG | OXYGEN SATURATION: 98 % | HEIGHT: 59 IN | TEMPERATURE: 98.1 F | DIASTOLIC BLOOD PRESSURE: 86 MMHG | RESPIRATION RATE: 18 BRPM | HEART RATE: 71 BPM

## 2021-07-07 LAB
ATRIAL RATE: 88 BPM
CALCULATED P AXIS, ECG09: 56 DEGREES
CALCULATED R AXIS, ECG10: 32 DEGREES
CALCULATED T AXIS, ECG11: 29 DEGREES
DIAGNOSIS, 93000: NORMAL
P-R INTERVAL, ECG05: 184 MS
Q-T INTERVAL, ECG07: 360 MS
QRS DURATION, ECG06: 72 MS
QTC CALCULATION (BEZET), ECG08: 435 MS
VENTRICULAR RATE, ECG03: 88 BPM

## 2021-07-07 PROCEDURE — 96375 TX/PRO/DX INJ NEW DRUG ADDON: CPT

## 2021-07-07 PROCEDURE — 94640 AIRWAY INHALATION TREATMENT: CPT

## 2021-07-07 PROCEDURE — 99218 HC RM OBSERVATION: CPT

## 2021-07-07 PROCEDURE — 96376 TX/PRO/DX INJ SAME DRUG ADON: CPT

## 2021-07-07 PROCEDURE — 74011250637 HC RX REV CODE- 250/637: Performed by: NURSE PRACTITIONER

## 2021-07-07 PROCEDURE — 74011000250 HC RX REV CODE- 250: Performed by: NURSE PRACTITIONER

## 2021-07-07 PROCEDURE — 74011250637 HC RX REV CODE- 250/637: Performed by: HOSPITALIST

## 2021-07-07 PROCEDURE — 74011250636 HC RX REV CODE- 250/636: Performed by: NURSE PRACTITIONER

## 2021-07-07 PROCEDURE — 74011250636 HC RX REV CODE- 250/636: Performed by: INTERNAL MEDICINE

## 2021-07-07 RX ORDER — DOXYCYCLINE 100 MG/1
100 CAPSULE ORAL EVERY 12 HOURS
Qty: 14 CAPSULE | Refills: 0 | Status: SHIPPED | OUTPATIENT
Start: 2021-07-07 | End: 2021-07-14

## 2021-07-07 RX ORDER — FUROSEMIDE 20 MG/1
TABLET ORAL
Qty: 30 TABLET | Refills: 0 | Status: SHIPPED | OUTPATIENT
Start: 2021-07-07

## 2021-07-07 RX ORDER — CODEINE PHOSPHATE AND GUAIFENESIN 10; 100 MG/5ML; MG/5ML
5 SOLUTION ORAL
Status: DISCONTINUED | OUTPATIENT
Start: 2021-07-07 | End: 2021-07-07 | Stop reason: HOSPADM

## 2021-07-07 RX ORDER — BENZONATATE 100 MG/1
100 CAPSULE ORAL
Qty: 10 CAPSULE | Refills: 0 | Status: SHIPPED | OUTPATIENT
Start: 2021-07-07 | End: 2021-07-14

## 2021-07-07 RX ORDER — PREDNISONE 20 MG/1
20 TABLET ORAL 2 TIMES DAILY
Qty: 10 TABLET | Refills: 0 | Status: SHIPPED | OUTPATIENT
Start: 2021-07-07

## 2021-07-07 RX ADMIN — HYDROCHLOROTHIAZIDE 12.5 MG: 25 TABLET ORAL at 08:49

## 2021-07-07 RX ADMIN — FUROSEMIDE 40 MG: 10 INJECTION, SOLUTION INTRAMUSCULAR; INTRAVENOUS at 08:48

## 2021-07-07 RX ADMIN — IPRATROPIUM BROMIDE AND ALBUTEROL SULFATE 3 ML: .5; 3 SOLUTION RESPIRATORY (INHALATION) at 07:56

## 2021-07-07 RX ADMIN — FOLIC ACID 1 MG: 1 TABLET ORAL at 08:49

## 2021-07-07 RX ADMIN — AMLODIPINE BESYLATE 10 MG: 5 TABLET ORAL at 08:50

## 2021-07-07 RX ADMIN — BUDESONIDE AND FORMOTEROL FUMARATE DIHYDRATE 2 PUFF: 80; 4.5 AEROSOL RESPIRATORY (INHALATION) at 07:57

## 2021-07-07 RX ADMIN — GABAPENTIN 300 MG: 300 CAPSULE ORAL at 08:49

## 2021-07-07 RX ADMIN — FAMOTIDINE 20 MG: 10 INJECTION, SOLUTION INTRAVENOUS at 08:46

## 2021-07-07 RX ADMIN — BENZONATATE 100 MG: 100 CAPSULE ORAL at 08:49

## 2021-07-07 RX ADMIN — BENZONATATE 100 MG: 100 CAPSULE ORAL at 12:18

## 2021-07-07 RX ADMIN — METHYLPREDNISOLONE SODIUM SUCCINATE 40 MG: 40 INJECTION, POWDER, FOR SOLUTION INTRAMUSCULAR; INTRAVENOUS at 05:36

## 2021-07-07 RX ADMIN — METHYLPREDNISOLONE SODIUM SUCCINATE 40 MG: 40 INJECTION, POWDER, FOR SOLUTION INTRAMUSCULAR; INTRAVENOUS at 14:42

## 2021-07-07 RX ADMIN — DOXYCYCLINE HYCLATE 100 MG: 100 CAPSULE ORAL at 05:00

## 2021-07-07 RX ADMIN — GUAIFENESIN AND CODEINE PHOSPHATE 5 ML: 100; 10 SOLUTION ORAL at 14:42

## 2021-07-07 NOTE — PROGRESS NOTES
Pulmonary and Critical Care progress note    Subjective:   Consult Note: 7/7/2021 @no control      Chief Complaint:   Chief Complaint   Patient presents with    Shortness of Breath    Cough        Patient seen and examined  Overnight events noted    Sitting up in bed comfortably  Ambulating in the room without difficulty  On room air  No acute distress  Admits to improved respiratory status    CT chest reviewed showing:  Bilateral perihilar infiltrates  Mosaic attenuation of the lung parenchyma for possible small airway disease  Borderline prevascular lymphadenopathy    Review of Systems:  A comprehensive review of systems was negative except for that written in the HPI.      Current Facility-Administered Medications   Medication Dose Route Frequency Provider Last Rate Last Admin    guaiFENesin-codeine (ROBITUSSIN AC) 100-10 mg/5 mL solution 5 mL  5 mL Oral Q6H PRN Kasey Viveros MD        ALPRAZolam Vasua Nik) tablet 0.5 mg  0.5 mg Oral BID PRN [place] Jan ISMA, NP        amLODIPine (NORVASC) tablet 10 mg  10 mg Oral DAILY Zarefoss Jan A, NP   10 mg at 07/07/21 0850    hydroCHLOROthiazide (HYDRODIURIL) tablet 12.5 mg  12.5 mg Oral BID Zarefoss Jan A, NP   12.5 mg at 07/07/21 0849    methylPREDNISolone (PF) (SOLU-MEDROL) injection 40 mg  40 mg IntraVENous Q8H Zarefoss Jan A, NP   40 mg at 26/51/39 4673    folic acid (FOLVITE) tablet 1 mg  1 mg Oral DAILY Zarefoss Jan A, NP   1 mg at 07/07/21 0849    gabapentin (NEURONTIN) capsule 300 mg  300 mg Oral TID Zarefoss Jan A, NP   300 mg at 07/07/21 0849    ondansetron (ZOFRAN) injection 4 mg  4 mg IntraVENous Q6H PRN Zaanel Jan ISMA, NP        ibuprofen (MOTRIN) tablet 200 mg  200 mg Oral Q6H PRN Zaanel Jan ISMA, NP        melatonin tablet 3 mg  3 mg Oral QHS PRN [place] Aric ISMA, NP        budesonide-formoterol (SYMBICORT) 80-4.5 mcg inhaler  2 Puff Inhalation BID RT [place] Aric ISMA, NP   2 Puff at 07/07/21 0757    albuterol-ipratropium (DUO-NEB) 2.5 MG-0.5 MG/3 ML  3 mL Nebulization Q4H PRN Aric Delarosa, NP   3 mL at 21 0756    albuterol (PROVENTIL HFA, VENTOLIN HFA, PROAIR HFA) inhaler 2 Puff  2 Puff Inhalation Q4H PRN Aric Delarosa, NP        benzonatate (TESSALON) capsule 100 mg  100 mg Oral TID Aric Delarosa, NP   100 mg at 21 0849    nicotine (NICODERM CQ) 7 mg/24 hr patch 1 Patch  1 Patch TransDERmal DAILY Aric Delarosa, NP   1 Patch at 21 0848    enoxaparin (LOVENOX) injection 40 mg  40 mg SubCUTAneous Q24H Aric Delarosa, NP        famotidine (PF) (PEPCID) 20 mg in 0.9% sodium chloride 10 mL injection  20 mg IntraVENous Q12H Aric Delarosa, NP   20 mg at 21 0846    furosemide (LASIX) injection 40 mg  40 mg IntraVENous DAILY Navin Hernandez MD   40 mg at 21 0848    doxycycline (VIBRAMYCIN) capsule 100 mg  100 mg Oral Q12H Olga Corea MD   100 mg at 21 0500          Allergies   Allergen Reactions    Lisinopril Other (comments)     Her aunt had lip edema           Objective:     Blood pressure (!) 152/89, pulse 84, temperature 97.7 °F (36.5 °C), resp. rate 18, height 4' 11\" (1.499 m), weight 133.8 kg (295 lb), SpO2 100 %. Temp (24hrs), Av.7 °F (36.5 °C), Min:97.7 °F (36.5 °C), Max:97.8 °F (36.6 °C)      Intake and Output:  Current Shift: No intake/output data recorded. Last 3 Shifts:  1901 -  0700  In: 600 [P.O.:600]  Out: -     Intake/Output Summary (Last 24 hours) at 2021 1058  Last data filed at 2021 0041  Gross per 24 hour   Intake 600 ml   Output    Net 600 ml        Physical Exam:     General:  Sitting up in bed comfortably, no acute distress. Obese  Eye: Reactive, symmetric  Throat and Neck: Supple  Lung: Reduced air entry bilaterally with prolonged exhalation, occ wheezing. Occasional crackles. Heart: S1+S2. No murmurs  Abdomen: soft, non-tender.  Bowel sounds normal. No masses; obese  Extremities:  1-2+ lower extremity edema  : Not done  Skin: No cyanosis  Neurologic: A & O x3. Grossly nonfocal  Psychiatric: Appropriate affect; coherent      Lab/Data Review:    No results found for this or any previous visit (from the past 24 hour(s)). CT CHEST WO CONT   Final Result   Mosaic attenuation suggesting sequela of small to medium airway   disease. Perihilar groundglass attenuation which could be hydrostatic edema. Infection or inflammatory process also possible. Other findings as above. XR CHEST SNGL V   Final Result   No acute pulmonary process. CT Results  (Last 48 hours)               07/06/21 1609  CT CHEST WO CONT Final result    Impression:  Mosaic attenuation suggesting sequela of small to medium airway   disease. Perihilar groundglass attenuation which could be hydrostatic edema. Infection or inflammatory process also possible. Other findings as above. Narrative:  CT chest, 7/6/2021       History: Shortness of breath. Comparison: Including chest radiograph, same date. Technique: No intravenous contrast was administered. Multiple contiguous axial   images were acquired from the thoracic inlet to the diaphragm. Sagittal,   coronal and axial MIP reconstruction of images was made. All CT scans at this facility are performed using dose reduction optimization   techniques as appropriate to perform the exam including the following: Automated   exposure control, adjustments of the mA and/or kV according to patient size, or   use iterative reconstruction technique. Findings: The included thyroid gland images normally. No axillary lymphadenopathy is noted. Prevascular lymph node measures 11 mm in short axis, borderline enlarged,   nonspecific in etiology. Evaluation of the norman is limited on this noncontrast   enhanced study. The heart size is within normal limits. No significant pericardial effusion is   seen. The thoracic aorta is normal in caliber.   The main pulmonary artery is normal in caliber. The lungs demonstrate mosaic attenuation suggesting sequela of small to medium   airway disease. There is groundglass attenuation in the perihilar regions,   right slightly greater than left. This could be hydrostatic edema. Infection   or inflammatory process are also possible. No pleural effusion or pneumothorax   is identified. No suspicious pulmonary nodules are evident. Mild degenerative changes are present in the thoracic spine. The included liver, gallbladder, pancreas, kidneys show no focal abnormality on   this noncontrast enhanced study. The adrenal glands and spleen images normally. Assessment:     1. Bilateral pulmonary infiltrates  2. Acute exacerbation of asthma  3. Acute diastolic congestive heart failure  4. Shortness of breath  5. Cough  6. Active smoker  7. Obesity  8. Rheumatoid arthritis  9. Leukocytosis  10.   Hypertension    Plan:     Gradual improvement in clinical status  Now on room air    Bilateral infiltrates  Mostly perihilar  Etiology unclear  BNP elevated  Have to consider CHF versus infectious process given patient's use of methotrexate and immunocompromised status    Currently on Solu-Medrol 40 mg every 8 hours  Currently on doxycycline 100 mg p.o. twice daily  Cultures to be sent  Further changes in antibiotics based on clinical response and culture results  We will proceed with CT scan without contrast to clarify the nature of perihilar infiltrates    Agree with Lasix 40 mg IV daily  Intake and output charting  Check electrolytes and replace as needed    Smoking cessation counseling was performed  NicoDerm patch    Continue blood pressure medications    DVT and GI prophylaxis    Improved clinical status  Possible discharge home today  Would recommend tapering p.o. prednisone over next 7 to 10 days  Complete doxycycline for 7 days total  We will follow up with me in outpatient clinic 7/16/2021 for PFTs and sleep study after discharge    Questions of patient were answered at bedside in detail  Case discussed in detail with RN, RT, and care team  Thank you for involving me in the care of this patient  I will follow with you closely during hospitalization    Time spent more than 30 minutes in direct patient care with no overlap reviewing results and records, decision making, and answering questions.       Can Mcpherson MD  [facility]  7/7/2021  3:12 PM

## 2021-07-07 NOTE — DISCHARGE SUMMARY
Hospitalist discharge summary              Daily Progress Note: 7/7/2021      Subjective: The patient is seen for follow up. She is a 77-year-old female with a history of asthma who was admitted yesterday with exacerbation of same. Chest x-ray showed bilateral perihilar infiltrates, also evident on chest CT.     Patient is doing much better today    Oxygen saturations are 100% on room air    She is on oral doxycycline currently    Problem List:  Problem List as of 7/7/2021 Date Reviewed: 7/6/2021        Codes Class Noted - Resolved    Pneumonia ICD-10-CM: J18.9  ICD-9-CM: 486  7/6/2021 - Present        Acute bronchitis with asthma with acute exacerbation ICD-10-CM: J20.9, J45.901  ICD-9-CM: 466.0, 493.92  7/6/2021 - Present              Medications reviewed  Current Facility-Administered Medications   Medication Dose Route Frequency    guaiFENesin-codeine (ROBITUSSIN AC) 100-10 mg/5 mL solution 5 mL  5 mL Oral Q6H PRN    ALPRAZolam (XANAX) tablet 0.5 mg  0.5 mg Oral BID PRN    amLODIPine (NORVASC) tablet 10 mg  10 mg Oral DAILY    hydroCHLOROthiazide (HYDRODIURIL) tablet 12.5 mg  12.5 mg Oral BID    methylPREDNISolone (PF) (SOLU-MEDROL) injection 40 mg  40 mg IntraVENous X6T    folic acid (FOLVITE) tablet 1 mg  1 mg Oral DAILY    gabapentin (NEURONTIN) capsule 300 mg  300 mg Oral TID    ondansetron (ZOFRAN) injection 4 mg  4 mg IntraVENous Q6H PRN    ibuprofen (MOTRIN) tablet 200 mg  200 mg Oral Q6H PRN    melatonin tablet 3 mg  3 mg Oral QHS PRN    budesonide-formoterol (SYMBICORT) 80-4.5 mcg inhaler  2 Puff Inhalation BID RT    albuterol-ipratropium (DUO-NEB) 2.5 MG-0.5 MG/3 ML  3 mL Nebulization Q4H PRN    albuterol (PROVENTIL HFA, VENTOLIN HFA, PROAIR HFA) inhaler 2 Puff  2 Puff Inhalation Q4H PRN    benzonatate (TESSALON) capsule 100 mg  100 mg Oral TID    nicotine (NICODERM CQ) 7 mg/24 hr patch 1 Patch  1 Patch TransDERmal DAILY    enoxaparin (LOVENOX) injection 40 mg  40 mg SubCUTAneous Q24H    famotidine (PF) (PEPCID) 20 mg in 0.9% sodium chloride 10 mL injection  20 mg IntraVENous Q12H    furosemide (LASIX) injection 40 mg  40 mg IntraVENous DAILY    doxycycline (VIBRAMYCIN) capsule 100 mg  100 mg Oral Q12H       Review of Systems:   A comprehensive review of systems was negative except for that written in the HPI. Objective:   Physical Exam:     Visit Vitals  BP (!) 152/89 (BP 1 Location: Left upper arm, BP Patient Position: At rest;Supine)   Pulse 84   Temp 97.7 °F (36.5 °C)   Resp 18   Ht 4' 11\" (1.499 m)   Wt 133.8 kg (295 lb)   SpO2 100%   BMI 59.58 kg/m²      O2 Device: None (Room air)    Temp (24hrs), Av.7 °F (36.5 °C), Min:97.7 °F (36.5 °C), Max:97.8 °F (36.6 °C)    No intake/output data recorded.  1901 -  0700  In: 600 [P.O.:600]  Out: -     General:   Awake and alert   Lungs:   Clear to auscultation bilaterally. Chest wall:  No tenderness or deformity. Heart:  Regular rate and rhythm, S1, S2 normal, no murmur, click, rub or gallop. Abdomen:   Soft, non-tender. Bowel sounds normal. No masses,  No organomegaly. Extremities: Extremities normal, atraumatic, no cyanosis or edema. Pulses: 2+ and symmetric all extremities. Skin: Skin color, texture, turgor normal. No rashes or lesions   Neurologic: CNII-XII intact. No gross focal deficits         Data Review:       Recent Days:  Recent Labs     21  1011   WBC 14.5*   HGB 12.1   HCT 36.2        Recent Labs     21  1011      K 3.0*      CO2 24   *   BUN 7   CREA 0.80   CA 8.9   ALB 3.1*   TBILI 0.2   ALT 29     No results for input(s): PH, PCO2, PO2, HCO3, FIO2 in the last 72 hours. 24 Hour Results:  No results found for this or any previous visit (from the past 24 hour(s)). CT CHEST WO CONT   Final Result   Mosaic attenuation suggesting sequela of small to medium airway   disease. Perihilar groundglass attenuation which could be hydrostatic edema. Infection or inflammatory process also possible. Other findings as above. XR CHEST SNGL V   Final Result   No acute pulmonary process. Assessment:  Acute exacerbation of asthma    Bilateral perihilar pneumonia    Morbid obesity    Hypokalemia    Rheumatoid arthritis    Hypertension    Fibromyalgia      Plan:  Discharge home  Continue doxycycline  Add Symbicort to her regimen  Follow-up with pulmonologist      Care Plan discussed with: Patient/Family    Disposition: Home today    Total time spent with patient: 30 minutes.     Noel Funk MD
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
## 2021-07-07 NOTE — PROGRESS NOTES
Pt discharged home self care. Discharge instructions, medication list, and follow up appointments reviewed with pt at bedside. Pt denies any further questions. Discharge instructions sent home with patient. [facility] Pt drove self home. No distress noted.

## 2021-07-12 LAB
BACTERIA SPEC CULT: NORMAL
SPECIAL REQUESTS,SREQ: NORMAL

## 2021-07-31 ENCOUNTER — HOSPITAL ENCOUNTER (EMERGENCY)
Age: 36
Discharge: HOME | End: 2021-08-01
Attending: EMERGENCY MEDICINE
Payer: COMMERCIAL

## 2021-07-31 ENCOUNTER — APPOINTMENT (OUTPATIENT)
Dept: GENERAL RADIOLOGY | Age: 36
End: 2021-07-31
Attending: EMERGENCY MEDICINE
Payer: COMMERCIAL

## 2021-07-31 ENCOUNTER — APPOINTMENT (OUTPATIENT)
Dept: CT IMAGING | Age: 36
End: 2021-07-31
Attending: EMERGENCY MEDICINE
Payer: COMMERCIAL

## 2021-07-31 DIAGNOSIS — R20.0 FACIAL NUMBNESS: Primary | ICD-10-CM

## 2021-07-31 DIAGNOSIS — R42 DIZZINESS: ICD-10-CM

## 2021-07-31 DIAGNOSIS — F41.1 ANXIETY STATE: ICD-10-CM

## 2021-07-31 LAB
ALBUMIN SERPL-MCNC: 3.6 G/DL (ref 3.5–5)
ALBUMIN/GLOB SERPL: 0.7 {RATIO} (ref 1.1–2.2)
ALP SERPL-CCNC: 97 U/L (ref 45–117)
ALT SERPL-CCNC: 31 U/L (ref 12–78)
ANION GAP SERPL CALC-SCNC: 3 MMOL/L (ref 5–15)
AST SERPL W P-5'-P-CCNC: 35 U/L (ref 15–37)
BASOPHILS # BLD: 0 K/UL (ref 0–0.1)
BASOPHILS NFR BLD: 0 % (ref 0–1)
BILIRUB SERPL-MCNC: 0.4 MG/DL (ref 0.2–1)
BUN SERPL-MCNC: 9 MG/DL (ref 6–20)
BUN/CREAT SERPL: 10 (ref 12–20)
CA-I BLD-MCNC: 9.3 MG/DL (ref 8.5–10.1)
CHLORIDE SERPL-SCNC: 104 MMOL/L (ref 97–108)
CK SERPL-CCNC: 119 NG/ML (ref 26–192)
CO2 SERPL-SCNC: 30 MMOL/L (ref 21–32)
CREAT SERPL-MCNC: 0.94 MG/DL (ref 0.55–1.02)
DIFFERENTIAL METHOD BLD: ABNORMAL
EOSINOPHIL # BLD: 0 K/UL (ref 0–0.4)
EOSINOPHIL NFR BLD: 0 % (ref 0–7)
ERYTHROCYTE [DISTWIDTH] IN BLOOD BY AUTOMATED COUNT: 16 % (ref 11.5–14.5)
GLOBULIN SER CALC-MCNC: 4.9 G/DL (ref 2–4)
GLUCOSE BLD STRIP.AUTO-MCNC: 109 MG/DL (ref 65–117)
GLUCOSE SERPL-MCNC: 109 MG/DL (ref 65–100)
HCG UR QL: NEGATIVE
HCT VFR BLD AUTO: 41.2 % (ref 35–47)
HGB BLD-MCNC: 13.2 G/DL (ref 11.5–16)
IMM GRANULOCYTES # BLD AUTO: 0 K/UL (ref 0–0.04)
IMM GRANULOCYTES NFR BLD AUTO: 0 % (ref 0–0.5)
LYMPHOCYTES # BLD: 1 K/UL (ref 0.8–3.5)
LYMPHOCYTES NFR BLD: 16 % (ref 12–49)
MCH RBC QN AUTO: 31.7 PG (ref 26–34)
MCHC RBC AUTO-ENTMCNC: 32 G/DL (ref 30–36.5)
MCV RBC AUTO: 99 FL (ref 80–99)
MONOCYTES # BLD: 0.1 K/UL (ref 0–1)
MONOCYTES NFR BLD: 2 % (ref 5–13)
NEUTS SEG # BLD: 4.7 K/UL (ref 1.8–8)
NEUTS SEG NFR BLD: 82 % (ref 32–75)
NRBC # BLD: 0 K/UL (ref 0–0.01)
NRBC BLD-RTO: 0 PER 100 WBC
PERFORMED BY, TECHID: NORMAL
PLATELET # BLD AUTO: 190 K/UL (ref 150–400)
PMV BLD AUTO: 10.6 FL (ref 8.9–12.9)
POTASSIUM SERPL-SCNC: 4 MMOL/L (ref 3.5–5.1)
PROT SERPL-MCNC: 8.5 G/DL (ref 6.4–8.2)
RBC # BLD AUTO: 4.16 M/UL (ref 3.8–5.2)
SODIUM SERPL-SCNC: 137 MMOL/L (ref 136–145)
TROPONIN I SERPL-MCNC: <0.05 NG/ML
WBC # BLD AUTO: 5.9 K/UL (ref 3.6–11)

## 2021-07-31 PROCEDURE — 71045 X-RAY EXAM CHEST 1 VIEW: CPT

## 2021-07-31 PROCEDURE — 81025 URINE PREGNANCY TEST: CPT

## 2021-07-31 PROCEDURE — 70450 CT HEAD/BRAIN W/O DYE: CPT

## 2021-07-31 PROCEDURE — 96374 THER/PROPH/DIAG INJ IV PUSH: CPT

## 2021-07-31 PROCEDURE — 82550 ASSAY OF CK (CPK): CPT

## 2021-07-31 PROCEDURE — 80053 COMPREHEN METABOLIC PANEL: CPT

## 2021-07-31 PROCEDURE — 36415 COLL VENOUS BLD VENIPUNCTURE: CPT

## 2021-07-31 PROCEDURE — 82962 GLUCOSE BLOOD TEST: CPT

## 2021-07-31 PROCEDURE — 84484 ASSAY OF TROPONIN QUANT: CPT

## 2021-07-31 PROCEDURE — 85025 COMPLETE CBC W/AUTO DIFF WBC: CPT

## 2021-07-31 PROCEDURE — 74011250636 HC RX REV CODE- 250/636: Performed by: EMERGENCY MEDICINE

## 2021-07-31 PROCEDURE — 99285 EMERGENCY DEPT VISIT HI MDM: CPT

## 2021-07-31 PROCEDURE — 93005 ELECTROCARDIOGRAM TRACING: CPT

## 2021-07-31 RX ORDER — LORAZEPAM 2 MG/ML
1 INJECTION INTRAMUSCULAR
Status: COMPLETED | OUTPATIENT
Start: 2021-07-31 | End: 2021-07-31

## 2021-07-31 RX ADMIN — SODIUM CHLORIDE 1000 ML: 9 INJECTION, SOLUTION INTRAVENOUS at 22:17

## 2021-07-31 RX ADMIN — LORAZEPAM 1 MG: 2 INJECTION INTRAMUSCULAR; INTRAVENOUS at 22:17

## 2021-08-01 VITALS
WEIGHT: 293 LBS | BODY MASS INDEX: 59.07 KG/M2 | OXYGEN SATURATION: 98 % | RESPIRATION RATE: 20 BRPM | HEART RATE: 98 BPM | TEMPERATURE: 98.9 F | HEIGHT: 59 IN | SYSTOLIC BLOOD PRESSURE: 144 MMHG | DIASTOLIC BLOOD PRESSURE: 82 MMHG

## 2021-08-01 LAB
ATRIAL RATE: 120 BPM
CALCULATED P AXIS, ECG09: 50 DEGREES
CALCULATED R AXIS, ECG10: 29 DEGREES
CALCULATED T AXIS, ECG11: 8 DEGREES
DIAGNOSIS, 93000: NORMAL
P-R INTERVAL, ECG05: 170 MS
Q-T INTERVAL, ECG07: 314 MS
QRS DURATION, ECG06: 70 MS
QTC CALCULATION (BEZET), ECG08: 443 MS
VENTRICULAR RATE, ECG03: 120 BPM

## 2021-08-01 RX ORDER — ALPRAZOLAM 1 MG/1
1 TABLET ORAL
Qty: 9 TABLET | Refills: 0 | Status: SHIPPED | OUTPATIENT
Start: 2021-08-01 | End: 2021-08-04

## 2021-08-01 NOTE — ED PROVIDER NOTES
EMERGENCY DEPARTMENT HISTORY AND PHYSICAL EXAM        Date: 7/31/2021  Patient Name: Telma Phelps    History of Presenting Illness     Chief Complaint   Patient presents with    Numbness    Dizziness       History Provided By: Patient    HPI: Telma Phelps, 38 y.o. female with history of asthma, fibromyalgia, hypertension, and rheumatoid arthritis who presents with dizziness. States that symptoms started yesterday morning. She noted she was feeling dizzy as if the room was spinning. She also has some lightheadedness as if she may pass out. States that she also noticed numbness on the left side of her face. She has felt as if she is had some slurred speech as well. She denies any weakness, vision problems. She has had to strain when she goes to urinate. She feels as if she may be constipated. She has been on prednisone and recently started back on her Humira. She is concerned these may be causing her symptoms. No other associated symptoms including no fevers, chest pain, abdominal pain    PCP: Charley Shirley MD    Current Outpatient Medications   Medication Sig Dispense Refill    furosemide (LASIX) 20 mg tablet 1 tablet daily 30 Tablet 0    predniSONE (DELTASONE) 20 mg tablet Take 20 mg by mouth two (2) times a day. 10 Tablet 0    gabapentin (NEURONTIN) 300 mg capsule Take 300 mg by mouth three (3) times daily.  ferrous sulfate 325 mg (65 mg iron) tablet Take  by mouth Daily (before breakfast).  hydroCHLOROthiazide (HYDRODIURIL) 12.5 mg tablet Take 12.5 mg by mouth daily.  folic acid (FOLVITE) 1 mg tablet Take 1 mg by mouth daily.  ALPRAZolam (XANAX) 0.5 mg tablet Take 0.5 mg by mouth three (3) times daily as needed for Anxiety. Indications: anxious      methotrexate (RHEUMATREX) 2.5 mg tablet Take 2.5 mg by mouth Every Friday. Pt states she takes 8 2.5mg tablets every friday      amitriptyline (ELAVIL) 10 mg tablet Take 10 mg by mouth nightly.       adalimumab (Humira) 40 mg/0.8 mL injection 40 mg by SubCUTAneous route. Pt states she takes this medication every other week on monday      predniSONE (DELTASONE) 5 mg tablet Take 5 mg by mouth daily. Past History     Past Medical History:  Past Medical History:   Diagnosis Date    Asthma     Fibromyalgia     Hypertension     Rheumatoid arthritis ([address]        Past Surgical History:  No past surgical history on file. Family History:  Family History   Problem Relation Age of Onset   [facility] Hypertension Mother     Hypertension Father     Asthma Sister     Asthma Other     Asthma Nephew        Social History:  Social History     Tobacco Use    Smoking status: Former Smoker     Packs/day: 0.50     Years: 10.00     Pack years: 5.00     Types: Cigarettes     Quit date: 2021     Years since quittin.0    Smokeless tobacco: Never Used   Substance Use Topics    Alcohol use: Not Currently    Drug use: Never       Allergies: Allergies   Allergen Reactions    Lisinopril Other (comments)     Her aunt had lip edema         Review of Systems   Review of Systems   Constitutional: Negative for fever. HENT: Negative for congestion. Eyes: Negative for visual disturbance. Respiratory: Negative for shortness of breath. Cardiovascular: Negative for chest pain. Gastrointestinal: Positive for constipation. Negative for abdominal pain and diarrhea. Genitourinary: Negative for dysuria. Musculoskeletal: Negative for arthralgias. Skin: Negative for rash. Neurological: Positive for dizziness, speech difficulty and light-headedness. Negative for headaches. Physical Exam   Constitutional: Patient is anxious appearing however nontoxic well-appearing. No acute distress. Well-nourished. Skin: No rash. ENT: No rhinorrhea. No cough. Head is normocephalic and atraumatic. Eye: No proptosis or conjunctival injections. Respiratory: No apparent respiratory distress. Gastrointestinal: Nondistended.  Abdomen is nontender. Musculoskeletal: No obvious bony deformities. Psychiatric: Anxious. Cooperative. Appropriate mood and affect. Neurological: Cranial nerves II through XII grossly intact. 5/5 strength in bilateral upper and lower extremities. Intact sensation to light touch on the face as well as the extremities. No slurred speech. Diagnostic Study Results     Labs -     Recent Results (from the past 24 hour(s))   GLUCOSE, POC    Collection Time: 07/31/21  6:54 PM   Result Value Ref Range    Glucose (POC) 109 65 - 117 mg/dL    Performed by Zoey SOTO    CBC WITH AUTOMATED DIFF    Collection Time: 07/31/21  7:20 PM   Result Value Ref Range    WBC 5.9 3.6 - 11.0 K/uL    RBC 4.16 3.80 - 5.20 M/uL    HGB 13.2 11.5 - 16.0 g/dL    HCT 41.2 35.0 - 47.0 %    MCV 99.0 80.0 - 99.0 FL    MCH 31.7 26.0 - 34.0 PG    MCHC 32.0 30.0 - 36.5 g/dL    RDW 16.0 (H) 11.5 - 14.5 %    PLATELET 971 182 - 659 K/uL    MPV 10.6 8.9 - 12.9 FL    NRBC 0.0 0.0  WBC    ABSOLUTE NRBC 0.00 0.00 - 0.01 K/uL    NEUTROPHILS 82 (H) 32 - 75 %    LYMPHOCYTES 16 12 - 49 %    MONOCYTES 2 (L) 5 - 13 %    EOSINOPHILS 0 0 - 7 %    BASOPHILS 0 0 - 1 %    IMMATURE GRANULOCYTES 0 0 - 0.5 %    ABS. NEUTROPHILS 4.7 1.8 - 8.0 K/UL    ABS. LYMPHOCYTES 1.0 0.8 - 3.5 K/UL    ABS. MONOCYTES 0.1 0.0 - 1.0 K/UL    ABS. EOSINOPHILS 0.0 0.0 - 0.4 K/UL    ABS. BASOPHILS 0.0 0.0 - 0.1 K/UL    ABS. IMM.  GRANS. 0.0 0.00 - 0.04 K/UL    DF AUTOMATED     METABOLIC PANEL, COMPREHENSIVE    Collection Time: 07/31/21  7:20 PM   Result Value Ref Range    Sodium 137 136 - 145 mmol/L    Potassium 4.0 3.5 - 5.1 mmol/L    Chloride 104 97 - 108 mmol/L    CO2 30 21 - 32 mmol/L    Anion gap 3 (L) 5 - 15 mmol/L    Glucose 109 (H) 65 - 100 mg/dL    BUN 9 6 - 20 mg/dL    Creatinine 0.94 0.55 - 1.02 mg/dL    BUN/Creatinine ratio 10 (L) 12 - 20      GFR est AA >60 >60 ml/min/1.73m2    GFR est non-AA >60 >60 ml/min/1.73m2    Calcium 9.3 8.5 - 10.1 mg/dL    Bilirubin, total 0.4 0.2 - 1.0 mg/dL    AST (SGOT) 35 15 - 37 U/L    ALT (SGPT) 31 12 - 78 U/L    Alk. phosphatase 97 45 - 117 U/L    Protein, total 8.5 (H) 6.4 - 8.2 g/dL    Albumin 3.6 3.5 - 5.0 g/dL    Globulin 4.9 (H) 2.0 - 4.0 g/dL    A-G Ratio 0.7 (L) 1.1 - 2.2     CK W/ REFLX CKMB    Collection Time: 07/31/21  7:20 PM   Result Value Ref Range    .0 26 - 192 ng/mL   TROPONIN I    Collection Time: 07/31/21  7:20 PM   Result Value Ref Range    Troponin-I, Qt. <0.05 <0.05 ng/mL   HCG URINE, QL    Collection Time: 07/31/21  7:22 PM   Result Value Ref Range    HCG urine, QL Negative Negative         Radiologic Studies -   XR CHEST PORT   Final Result   Findings/impression:      Slightly limited study due to body habitus. Low lung volumes. Lungs are clear. Normal cardiac mediastinal silhouette. No pneumothorax or effusion. No overt   edema. CT HEAD WO CONT   Final Result   No acute intracranial findings. CT Results  (Last 48 hours)               07/31/21 1958  CT HEAD WO CONT Final result    Impression:  No acute intracranial findings. Narrative:  Noncontrast CT of brain :  Axial images with multiplanar reformats. Dose Reduction:  All CT scans at this facility are performed using dose   reduction optimization techniques as appropriate to a performed exam including   the following: Automated exposure control, adjustments of the mA and/or kV   according to patient size, or use of iterative reconstruction technique. Comparison:  Head CT generated 20/9/2009       FINDINGS:   The posterior fossa is normal, with normal shape and size of the fourth   ventricle, the cerebellum and brainstem. Supratentorially, the lateral and third   ventricles are normal. The extra-axial CSF spaces are normal for age. There is   normal gray-white matter differentiation. There is no evidence for midline   shift, intracranial masses or hemorrhage. No definite evidence for acute   infarct. No fractures. Clear sinuses. CXR Results  (Last 48 hours)               07/31/21 2035  XR CHEST PORT Final result    Impression:  Findings/impression:       Slightly limited study due to body habitus. Low lung volumes. Lungs are clear. Normal cardiac mediastinal silhouette. No pneumothorax or effusion. No overt   edema. Narrative:  AP semiupright portable chest x-ray:       COMPARISON: Chest x-ray July 6, 2021. Medical Decision Making and ED Course     I reviewed the available vital signs, nursing notes, past medical history, past surgical history, family history, and social history. Vital Signs - Reviewed the patient's vital signs. Patient Vitals for the past 12 hrs:   Temp Pulse Resp BP SpO2   07/31/21 2100  (!) 105 20 (!) 161/112 98 %   07/31/21 1900  (!) 115 20 (!) 139/116 98 %   07/31/21 1859 99.8 °F (37.7 °C) (!) 118 20 (!) 140/97 97 %       EKG interpretation: Obtained on 7/31/2021 at 1856. Read at 1900. Sinus tachycardia at rate of 120 bpm. Normal DC interval, QRS duration, QTc interval.  No ST segment abnormalities. Normal axis. Medical Decision Making:   Presented with dizziness and facial numbness. The differential diagnosis is TIA, CVA, electrolyte abnormality, dehydration, anxiety, demyelinating disorder, orthostatic hypotension, vasovagal episode. Work-up is negative. Patient appears very anxious. I believe a lot of her symptoms are related to anxiety. She has no neurological deficits. No signs of stroke other than her numbness to her face that is subjective. Patient reassured and discharged. She was given IV fluids for mild tachycardia. She was also given p.o. fluids. I do not suspect severe cause of her symptoms and suspect anxiety is large portion of her symptoms. I do not feel that invasive procedures any further tests or necessary at this time. I recommended PCP follow-up. Disposition     Discharged      DISCHARGE PLAN:  1.    Current Discharge Medication List CONTINUE these medications which have NOT CHANGED    Details   furosemide (LASIX) 20 mg tablet 1 tablet daily  Qty: 30 Tablet, Refills: 0      !! predniSONE (DELTASONE) 20 mg tablet Take 20 mg by mouth two (2) times a day. Qty: 10 Tablet, Refills: 0      gabapentin (NEURONTIN) 300 mg capsule Take 300 mg by mouth three (3) times daily. ferrous sulfate 325 mg (65 mg iron) tablet Take  by mouth Daily (before breakfast). hydroCHLOROthiazide (HYDRODIURIL) 12.5 mg tablet Take 12.5 mg by mouth daily. folic acid (FOLVITE) 1 mg tablet Take 1 mg by mouth daily. ALPRAZolam (XANAX) 0.5 mg tablet Take 0.5 mg by mouth three (3) times daily as needed for Anxiety. Indications: anxious      methotrexate (RHEUMATREX) 2.5 mg tablet Take 2.5 mg by mouth Every Friday. Pt states she takes 8 2.5mg tablets every friday      amitriptyline (ELAVIL) 10 mg tablet Take 10 mg by mouth nightly. adalimumab (Humira) 40 mg/0.8 mL injection 40 mg by SubCUTAneous route. Pt states she takes this medication every other week on monday      !! predniSONE (DELTASONE) 5 mg tablet Take 5 mg by mouth daily. !! - Potential duplicate medications found. Please discuss with provider. 2.   Follow-up Information     Follow up With Specialties Details Why [address] [facility] EMERGENCY DEPT Emergency Medicine Go today As soon as possible if symptoms worsen [id number] [facility]] [id number] [phone]    Primary care doctor  Schedule an appointment as soon as possible for a visit in 3 days          3. Return to ED if worse     Diagnosis     Clinical impression:   1. Facial numbness    2. Anxiety state    3. Dizziness           Attestation:  Please note that this dictation was completed with [employer], the [employer] voice recognition software. Quite often unanticipated grammatical, syntax, homophones, and other interpretive errors are inadvertently transcribed by the computer software.  Please disregard these errors. Please excuse any errors that have escaped final proofreading. Thank you.   Cloteal Malling, DO